# Patient Record
Sex: FEMALE | Race: WHITE | NOT HISPANIC OR LATINO | Employment: OTHER | URBAN - METROPOLITAN AREA
[De-identification: names, ages, dates, MRNs, and addresses within clinical notes are randomized per-mention and may not be internally consistent; named-entity substitution may affect disease eponyms.]

---

## 2018-06-01 ENCOUNTER — HOSPITAL ENCOUNTER (INPATIENT)
Facility: HOSPITAL | Age: 73
LOS: 3 days | Discharge: HOME HEALTH | DRG: 074 | End: 2018-06-04
Attending: EMERGENCY MEDICINE | Admitting: EMERGENCY MEDICINE
Payer: MEDICARE

## 2018-06-01 DIAGNOSIS — Z74.09 IMPAIRED FUNCTIONAL MOBILITY AND ENDURANCE: ICD-10-CM

## 2018-06-01 DIAGNOSIS — Z92.82 STATUS POST ADMINISTRATION OF TPA (RTPA) IN A DIFFERENT FACILITY WITHIN THE LAST 24 HOURS PRIOR TO ADMISSION TO CURRENT FACILITY: ICD-10-CM

## 2018-06-01 DIAGNOSIS — G51.0 BELL'S PALSY: ICD-10-CM

## 2018-06-01 DIAGNOSIS — I10 ESSENTIAL HYPERTENSION: ICD-10-CM

## 2018-06-01 DIAGNOSIS — E78.2 MIXED HYPERLIPIDEMIA: ICD-10-CM

## 2018-06-01 DIAGNOSIS — I63.9 STROKE: Primary | ICD-10-CM

## 2018-06-01 DIAGNOSIS — R53.1 LEFT-SIDED WEAKNESS: ICD-10-CM

## 2018-06-01 PROBLEM — I63.311 THROMBOTIC STROKE INVOLVING RIGHT MIDDLE CEREBRAL ARTERY: Status: ACTIVE | Noted: 2018-06-01

## 2018-06-01 PROBLEM — E78.5 HYPERLIPIDEMIA: Status: ACTIVE | Noted: 2018-06-01

## 2018-06-01 PROBLEM — E11.9 TYPE 2 DIABETES MELLITUS: Status: ACTIVE | Noted: 2018-06-01

## 2018-06-01 LAB
ABO + RH BLD: NORMAL
ALBUMIN SERPL BCP-MCNC: 4 G/DL
ALP SERPL-CCNC: 45 U/L
ALT SERPL W/O P-5'-P-CCNC: 26 U/L
ANION GAP SERPL CALC-SCNC: 9 MMOL/L
APTT BLDCRRT: 24.4 SEC
AST SERPL-CCNC: 27 U/L
BASOPHILS # BLD AUTO: 0.04 K/UL
BASOPHILS NFR BLD: 0.5 %
BILIRUB SERPL-MCNC: 0.5 MG/DL
BILIRUB UR QL STRIP: NEGATIVE
BLD GP AB SCN CELLS X3 SERPL QL: NORMAL
BUN SERPL-MCNC: 18 MG/DL
CALCIUM SERPL-MCNC: 9.8 MG/DL
CHLORIDE SERPL-SCNC: 105 MMOL/L
CHOLEST SERPL-MCNC: 103 MG/DL
CHOLEST/HDLC SERPL: 2.1 {RATIO}
CK MB SERPL-MCNC: 1.4 NG/ML
CK MB SERPL-RTO: 2.6 %
CK SERPL-CCNC: 54 U/L
CLARITY UR REFRACT.AUTO: CLEAR
CO2 SERPL-SCNC: 24 MMOL/L
COLOR UR AUTO: ABNORMAL
CREAT SERPL-MCNC: 0.7 MG/DL (ref 0.5–1.4)
CREAT SERPL-MCNC: 0.8 MG/DL
DIFFERENTIAL METHOD: NORMAL
EOSINOPHIL # BLD AUTO: 0.1 K/UL
EOSINOPHIL NFR BLD: 1.8 %
ERYTHROCYTE [DISTWIDTH] IN BLOOD BY AUTOMATED COUNT: 11.8 %
EST. GFR  (AFRICAN AMERICAN): >60 ML/MIN/1.73 M^2
EST. GFR  (NON AFRICAN AMERICAN): >60 ML/MIN/1.73 M^2
ESTIMATED AVG GLUCOSE: 180 MG/DL
ETHANOL SERPL-MCNC: <10 MG/DL
GLUCOSE SERPL-MCNC: 144 MG/DL
GLUCOSE UR QL STRIP: ABNORMAL
HBA1C MFR BLD HPLC: 7.9 %
HCT VFR BLD AUTO: 38.5 %
HDLC SERPL-MCNC: 48 MG/DL
HDLC SERPL: 46.6 %
HGB BLD-MCNC: 13 G/DL
HGB UR QL STRIP: NEGATIVE
IMM GRANULOCYTES # BLD AUTO: 0.04 K/UL
IMM GRANULOCYTES NFR BLD AUTO: 0.5 %
INR PPP: 1.1
KETONES UR QL STRIP: NEGATIVE
LDLC SERPL CALC-MCNC: 28.6 MG/DL
LEUKOCYTE ESTERASE UR QL STRIP: ABNORMAL
LYMPHOCYTES # BLD AUTO: 2 K/UL
LYMPHOCYTES NFR BLD: 24.8 %
MCH RBC QN AUTO: 30.4 PG
MCHC RBC AUTO-ENTMCNC: 33.8 G/DL
MCV RBC AUTO: 90 FL
MICROSCOPIC COMMENT: ABNORMAL
MONOCYTES # BLD AUTO: 0.8 K/UL
MONOCYTES NFR BLD: 10.4 %
NEUTROPHILS # BLD AUTO: 4.9 K/UL
NEUTROPHILS NFR BLD: 62 %
NITRITE UR QL STRIP: NEGATIVE
NON-SQ EPI CELLS #/AREA URNS AUTO: 1 /HPF
NONHDLC SERPL-MCNC: 55 MG/DL
NRBC BLD-RTO: 0 /100 WBC
PH UR STRIP: 6 [PH] (ref 5–8)
PLATELET # BLD AUTO: 276 K/UL
PMV BLD AUTO: 10.3 FL
POC PTINR: 1.1 (ref 0.9–1.2)
POC PTWBT: 12.6 SEC (ref 9.7–14.3)
POCT GLUCOSE: 127 MG/DL (ref 70–110)
POTASSIUM SERPL-SCNC: 4 MMOL/L
PROT SERPL-MCNC: 6.8 G/DL
PROT UR QL STRIP: NEGATIVE
PROTHROMBIN TIME: 11.7 SEC
RBC # BLD AUTO: 4.28 M/UL
SAMPLE: NORMAL
SAMPLE: NORMAL
SODIUM SERPL-SCNC: 138 MMOL/L
SP GR UR STRIP: 1.01 (ref 1–1.03)
SQUAMOUS #/AREA URNS AUTO: 0 /HPF
TRIGL SERPL-MCNC: 132 MG/DL
TROPONIN I SERPL DL<=0.01 NG/ML-MCNC: <0.006 NG/ML
TSH SERPL DL<=0.005 MIU/L-ACNC: 1.82 UIU/ML
URN SPEC COLLECT METH UR: ABNORMAL
UROBILINOGEN UR STRIP-ACNC: NEGATIVE EU/DL
WBC # BLD AUTO: 7.87 K/UL
WBC #/AREA URNS AUTO: 6 /HPF (ref 0–5)

## 2018-06-01 PROCEDURE — 99233 SBSQ HOSP IP/OBS HIGH 50: CPT | Mod: ,,, | Performed by: PSYCHIATRY & NEUROLOGY

## 2018-06-01 PROCEDURE — 81001 URINALYSIS AUTO W/SCOPE: CPT

## 2018-06-01 PROCEDURE — 93010 ELECTROCARDIOGRAM REPORT: CPT | Mod: 76,,, | Performed by: INTERNAL MEDICINE

## 2018-06-01 PROCEDURE — 25000003 PHARM REV CODE 250: Performed by: NURSE PRACTITIONER

## 2018-06-01 PROCEDURE — 99285 EMERGENCY DEPT VISIT HI MDM: CPT | Mod: 25

## 2018-06-01 PROCEDURE — 86901 BLOOD TYPING SEROLOGIC RH(D): CPT

## 2018-06-01 PROCEDURE — 82550 ASSAY OF CK (CPK): CPT

## 2018-06-01 PROCEDURE — 94761 N-INVAS EAR/PLS OXIMETRY MLT: CPT

## 2018-06-01 PROCEDURE — 80061 LIPID PANEL: CPT

## 2018-06-01 PROCEDURE — 96374 THER/PROPH/DIAG INJ IV PUSH: CPT

## 2018-06-01 PROCEDURE — 85610 PROTHROMBIN TIME: CPT

## 2018-06-01 PROCEDURE — 80320 DRUG SCREEN QUANTALCOHOLS: CPT

## 2018-06-01 PROCEDURE — 93005 ELECTROCARDIOGRAM TRACING: CPT

## 2018-06-01 PROCEDURE — 99900035 HC TECH TIME PER 15 MIN (STAT)

## 2018-06-01 PROCEDURE — 99285 EMERGENCY DEPT VISIT HI MDM: CPT | Mod: ,,, | Performed by: EMERGENCY MEDICINE

## 2018-06-01 PROCEDURE — 84484 ASSAY OF TROPONIN QUANT: CPT

## 2018-06-01 PROCEDURE — 84443 ASSAY THYROID STIM HORMONE: CPT

## 2018-06-01 PROCEDURE — 25000003 PHARM REV CODE 250: Performed by: EMERGENCY MEDICINE

## 2018-06-01 PROCEDURE — 93010 ELECTROCARDIOGRAM REPORT: CPT | Mod: ,,, | Performed by: INTERNAL MEDICINE

## 2018-06-01 PROCEDURE — 80053 COMPREHEN METABOLIC PANEL: CPT

## 2018-06-01 PROCEDURE — 85025 COMPLETE CBC W/AUTO DIFF WBC: CPT

## 2018-06-01 PROCEDURE — 85730 THROMBOPLASTIN TIME PARTIAL: CPT

## 2018-06-01 PROCEDURE — 82962 GLUCOSE BLOOD TEST: CPT

## 2018-06-01 PROCEDURE — 20000000 HC ICU ROOM

## 2018-06-01 PROCEDURE — 83036 HEMOGLOBIN GLYCOSYLATED A1C: CPT

## 2018-06-01 PROCEDURE — 82803 BLOOD GASES ANY COMBINATION: CPT

## 2018-06-01 PROCEDURE — 82553 CREATINE MB FRACTION: CPT

## 2018-06-01 PROCEDURE — 82565 ASSAY OF CREATININE: CPT

## 2018-06-01 PROCEDURE — 99223 1ST HOSP IP/OBS HIGH 75: CPT | Mod: ,,, | Performed by: NURSE PRACTITIONER

## 2018-06-01 PROCEDURE — 25500020 PHARM REV CODE 255: Performed by: EMERGENCY MEDICINE

## 2018-06-01 RX ORDER — LABETALOL HYDROCHLORIDE 5 MG/ML
10 INJECTION, SOLUTION INTRAVENOUS ONCE
Status: DISCONTINUED | OUTPATIENT
Start: 2018-06-01 | End: 2018-06-01

## 2018-06-01 RX ORDER — GLUCAGON 1 MG
1 KIT INJECTION
Status: DISCONTINUED | OUTPATIENT
Start: 2018-06-01 | End: 2018-06-04 | Stop reason: HOSPADM

## 2018-06-01 RX ORDER — INSULIN ASPART 100 [IU]/ML
1-10 INJECTION, SOLUTION INTRAVENOUS; SUBCUTANEOUS EVERY 6 HOURS PRN
Status: DISCONTINUED | OUTPATIENT
Start: 2018-06-01 | End: 2018-06-04 | Stop reason: HOSPADM

## 2018-06-01 RX ORDER — LABETALOL HYDROCHLORIDE 5 MG/ML
10 INJECTION, SOLUTION INTRAVENOUS EVERY 4 HOURS PRN
Status: DISCONTINUED | OUTPATIENT
Start: 2018-06-01 | End: 2018-06-04 | Stop reason: HOSPADM

## 2018-06-01 RX ORDER — POTASSIUM CHLORIDE 20 MEQ/15ML
40 SOLUTION ORAL
Status: DISCONTINUED | OUTPATIENT
Start: 2018-06-01 | End: 2018-06-03

## 2018-06-01 RX ORDER — SODIUM,POTASSIUM PHOSPHATES 280-250MG
2 POWDER IN PACKET (EA) ORAL
Status: DISCONTINUED | OUTPATIENT
Start: 2018-06-01 | End: 2018-06-03

## 2018-06-01 RX ORDER — LABETALOL HYDROCHLORIDE 5 MG/ML
10 INJECTION, SOLUTION INTRAVENOUS ONCE
Status: COMPLETED | OUTPATIENT
Start: 2018-06-01 | End: 2018-06-01

## 2018-06-01 RX ORDER — POLYETHYLENE GLYCOL 3350 17 G/17G
17 POWDER, FOR SOLUTION ORAL DAILY
Status: DISCONTINUED | OUTPATIENT
Start: 2018-06-02 | End: 2018-06-04 | Stop reason: HOSPADM

## 2018-06-01 RX ORDER — LANOLIN ALCOHOL/MO/W.PET/CERES
800 CREAM (GRAM) TOPICAL
Status: DISCONTINUED | OUTPATIENT
Start: 2018-06-01 | End: 2018-06-03

## 2018-06-01 RX ORDER — SODIUM CHLORIDE 9 MG/ML
INJECTION, SOLUTION INTRAVENOUS CONTINUOUS
Status: DISCONTINUED | OUTPATIENT
Start: 2018-06-01 | End: 2018-06-02

## 2018-06-01 RX ORDER — SODIUM CHLORIDE 0.9 % (FLUSH) 0.9 %
3 SYRINGE (ML) INJECTION EVERY 8 HOURS
Status: DISCONTINUED | OUTPATIENT
Start: 2018-06-01 | End: 2018-06-04 | Stop reason: HOSPADM

## 2018-06-01 RX ORDER — ATORVASTATIN CALCIUM 20 MG/1
40 TABLET, FILM COATED ORAL DAILY
Status: DISCONTINUED | OUTPATIENT
Start: 2018-06-02 | End: 2018-06-04 | Stop reason: HOSPADM

## 2018-06-01 RX ORDER — ACETAMINOPHEN 325 MG/1
650 TABLET ORAL EVERY 6 HOURS PRN
Status: DISCONTINUED | OUTPATIENT
Start: 2018-06-01 | End: 2018-06-04 | Stop reason: HOSPADM

## 2018-06-01 RX ORDER — ONDANSETRON 8 MG/1
8 TABLET, ORALLY DISINTEGRATING ORAL EVERY 8 HOURS PRN
Status: DISCONTINUED | OUTPATIENT
Start: 2018-06-01 | End: 2018-06-04 | Stop reason: HOSPADM

## 2018-06-01 RX ADMIN — IOHEXOL 100 ML: 350 INJECTION, SOLUTION INTRAVENOUS at 06:06

## 2018-06-01 RX ADMIN — LABETALOL HYDROCHLORIDE 10 MG: 5 INJECTION INTRAVENOUS at 07:06

## 2018-06-01 RX ADMIN — SODIUM CHLORIDE: 0.9 INJECTION, SOLUTION INTRAVENOUS at 09:06

## 2018-06-01 NOTE — ED PROVIDER NOTES
Encounter Date: 6/1/2018    SCRIBE #1 NOTE: I, Reena Grady, am scribing for, and in the presence of,  Dr. Khan. I have scribed the entire note.       History     Chief Complaint   Patient presents with    Cerebrovascular Accident     Pt is a s/p TPA transfer from [facility] in [place], [place].      Time patient was seen by the provider: 6:55 PM      The patient is a 73 yo W with a phx of DM and HTN who was transferred from [facility] in [place], [place] to the ED for neuro evaluation. The patient was initially presenting with CVA symptoms and received TPA. Patient was accepted by Neurology. She endorses acute onset difficulty speaking, difficulty sucking through a straw which has improved somewhat. Reports feeling improved s/p TPA. Denies HA. Patient denies any known hx of irregular heart rhythm. Denies CP.          Review of patient's allergies indicates:  Not on File  No past medical history on file.  No past surgical history on file.  No family history on file.  Social History   Substance Use Topics    Smoking status: Not on file    Smokeless tobacco: Not on file    Alcohol use Not on file     Review of Systems   Constitutional: Negative for fever.   HENT: Negative for sore throat.    Respiratory: Positive for chest tightness. Negative for shortness of breath.    Cardiovascular: Negative for chest pain.   Gastrointestinal: Negative for nausea.   Genitourinary: Negative for dysuria.   Musculoskeletal: Negative for back pain.   Skin: Negative for rash.   Neurological: Positive for speech difficulty. Negative for weakness.   Hematological: Does not bruise/bleed easily.       Physical Exam     Initial Vitals [06/01/18 1832]   BP Pulse Resp Temp SpO2   (!) 164/77 67 16 98.6 °F (37 °C) 98 %      MAP       106         Physical Exam    Nursing note and vitals reviewed.  Constitutional: She appears well-developed and well-nourished. She is not diaphoretic. No distress.   HENT:   Head:  Normocephalic and atraumatic.   Mouth/Throat: Oropharynx is clear and moist.   Eyes: Conjunctivae and EOM are normal.   Neck: Normal range of motion. Neck supple. No JVD present.   Cardiovascular: Normal rate, regular rhythm, normal heart sounds and intact distal pulses. Exam reveals no gallop and no friction rub.    No murmur heard.  Pulmonary/Chest: Breath sounds normal. No respiratory distress. She has no wheezes. She has no rhonchi. She has no rales. She exhibits no tenderness.   Abdominal: Soft. Bowel sounds are normal. She exhibits no distension and no mass. There is no tenderness. There is no rebound and no guarding.   Musculoskeletal: Normal range of motion. She exhibits no tenderness.   Lymphadenopathy:     She has no cervical adenopathy.   Neurological: She is alert and oriented to person, place, and time. She has normal strength. A cranial nerve deficit is present. No sensory deficit.   Flattening of left nasolabial fold. Dysarthria. Cranial nerves otherwise intact. Negative pronator drift. Finger to nose symmetry bilaterally.    Skin: Skin is warm and dry. Capillary refill takes less than 2 seconds.   Psychiatric: She has a normal mood and affect.         ED Course   Procedures  Labs Reviewed   COMPREHENSIVE METABOLIC PANEL - Abnormal; Notable for the following:        Result Value    Glucose 144 (*)     Alkaline Phosphatase 45 (*)     All other components within normal limits   LIPID PANEL - Abnormal; Notable for the following:     Cholesterol 103 (*)     LDL Cholesterol 28.6 (*)     All other components within normal limits   URINALYSIS - Abnormal; Notable for the following:     Glucose, UA 1+ (*)     Leukocytes, UA 1+ (*)     All other components within normal limits   URINALYSIS MICROSCOPIC - Abnormal; Notable for the following:     WBC, UA 6 (*)     Non-Squam Epith 1 (*)     All other components within normal limits   POCT GLUCOSE - Abnormal; Notable for the following:     POCT Glucose 127 (*)      All other components within normal limits   CBC W/ AUTO DIFFERENTIAL   PROTIME-INR   TSH   TROPONIN I   CK-MB   APTT   ALCOHOL,MEDICAL (ETHANOL)   HEMOGLOBIN A1C   HEMOGLOBIN A1C   POCT GLUCOSE   TYPE & SCREEN   ISTAT CREATININE   ISTAT PROCEDURE   POCT GLUCOSE MONITORING CONTINUOUS     EKG Readings: (Independently Interpreted)   Sinus rhythm first degree AV block at a rate of 61. Left axis deviation. ST segments normal. T waves normal.           Medical Decision Making:   History:   Old Medical Records: I decided to obtain old medical records.  Initial Assessment:   72 Y.O. female with a hx of DM and HTN presents as a transfer for CVA s/p TPA. She was seen and evaluated by the vascular neurology team. CTA multiphase revealed no large vesicle occlusion amenable to procedure. Will admit to stroke unit.             Scribe Attestation:   Scribe #1: I performed the above scribed service and the documentation accurately describes the services I performed. I attest to the accuracy of the note.               Clinical Impression:   The encounter diagnosis was Stroke.    X-Ray Chest AP Portable   Final Result      See above      No acute process seen.         Electronically signed by: Abner Will MD   Date:    06/01/2018   Time:    19:41      CTA STROKE MULTI-PHASE   Final Result      Normal multiphase CT angiogram of the head and neck.  No evidence of large vessel occlusion in the intracranial circulation or hemodynamically significant stenosis in the neck vessels.      No acute intracranial abnormality.  Please consider MRI of the brain for follow-up.      Electronically signed by resident: Ryan Johnson   Date:    06/01/2018   Time:    18:56      Electronically signed by: David Thornton MD   Date:    06/01/2018   Time:    19:26                                 Cosmo Khan MD  06/01/18 2059

## 2018-06-02 PROBLEM — E78.2 MIXED HYPERLIPIDEMIA: Status: ACTIVE | Noted: 2018-06-01

## 2018-06-02 LAB
ALBUMIN SERPL BCP-MCNC: 3.8 G/DL
ALP SERPL-CCNC: 45 U/L
ALT SERPL W/O P-5'-P-CCNC: 25 U/L
ANION GAP SERPL CALC-SCNC: 9 MMOL/L
AST SERPL-CCNC: 22 U/L
BASOPHILS # BLD AUTO: 0.03 K/UL
BASOPHILS NFR BLD: 0.4 %
BILIRUB SERPL-MCNC: 0.7 MG/DL
BUN SERPL-MCNC: 17 MG/DL
CALCIUM SERPL-MCNC: 9.7 MG/DL
CHLORIDE SERPL-SCNC: 107 MMOL/L
CO2 SERPL-SCNC: 25 MMOL/L
CREAT SERPL-MCNC: 0.8 MG/DL
DIASTOLIC DYSFUNCTION: NO
DIFFERENTIAL METHOD: NORMAL
EOSINOPHIL # BLD AUTO: 0.2 K/UL
EOSINOPHIL NFR BLD: 2.6 %
ERYTHROCYTE [DISTWIDTH] IN BLOOD BY AUTOMATED COUNT: 11.7 %
EST. GFR  (AFRICAN AMERICAN): >60 ML/MIN/1.73 M^2
EST. GFR  (NON AFRICAN AMERICAN): >60 ML/MIN/1.73 M^2
ESTIMATED PA SYSTOLIC PRESSURE: 24.34
GLUCOSE SERPL-MCNC: 116 MG/DL
HCT VFR BLD AUTO: 37.4 %
HGB BLD-MCNC: 12.5 G/DL
IMM GRANULOCYTES # BLD AUTO: 0.03 K/UL
IMM GRANULOCYTES NFR BLD AUTO: 0.4 %
INR PPP: 1.1
LYMPHOCYTES # BLD AUTO: 2.4 K/UL
LYMPHOCYTES NFR BLD: 31.7 %
MAGNESIUM SERPL-MCNC: 1.9 MG/DL
MCH RBC QN AUTO: 30.6 PG
MCHC RBC AUTO-ENTMCNC: 33.4 G/DL
MCV RBC AUTO: 91 FL
MONOCYTES # BLD AUTO: 0.8 K/UL
MONOCYTES NFR BLD: 11.1 %
NEUTROPHILS # BLD AUTO: 4.1 K/UL
NEUTROPHILS NFR BLD: 53.8 %
NRBC BLD-RTO: 0 /100 WBC
PHOSPHATE SERPL-MCNC: 3.1 MG/DL
PLATELET # BLD AUTO: 260 K/UL
PMV BLD AUTO: 10.2 FL
POCT GLUCOSE: 105 MG/DL (ref 70–110)
POCT GLUCOSE: 154 MG/DL (ref 70–110)
POCT GLUCOSE: 218 MG/DL (ref 70–110)
POCT GLUCOSE: 220 MG/DL (ref 70–110)
POTASSIUM SERPL-SCNC: 3.8 MMOL/L
PROT SERPL-MCNC: 6.3 G/DL
PROTHROMBIN TIME: 11.6 SEC
RBC # BLD AUTO: 4.09 M/UL
RETIRED EF AND QEF - SEE NOTES: 65 (ref 55–65)
SODIUM SERPL-SCNC: 141 MMOL/L
TRICUSPID VALVE REGURGITATION: NORMAL
WBC # BLD AUTO: 7.55 K/UL

## 2018-06-02 PROCEDURE — G8997 SWALLOW GOAL STATUS: HCPCS | Mod: CI

## 2018-06-02 PROCEDURE — 84100 ASSAY OF PHOSPHORUS: CPT

## 2018-06-02 PROCEDURE — 80053 COMPREHEN METABOLIC PANEL: CPT

## 2018-06-02 PROCEDURE — 97161 PT EVAL LOW COMPLEX 20 MIN: CPT

## 2018-06-02 PROCEDURE — 97166 OT EVAL MOD COMPLEX 45 MIN: CPT

## 2018-06-02 PROCEDURE — 63600175 PHARM REV CODE 636 W HCPCS: Performed by: NURSE PRACTITIONER

## 2018-06-02 PROCEDURE — G8988 SELF CARE GOAL STATUS: HCPCS | Mod: CJ

## 2018-06-02 PROCEDURE — 93306 TTE W/DOPPLER COMPLETE: CPT

## 2018-06-02 PROCEDURE — 25000003 PHARM REV CODE 250: Performed by: PHYSICIAN ASSISTANT

## 2018-06-02 PROCEDURE — 97116 GAIT TRAINING THERAPY: CPT

## 2018-06-02 PROCEDURE — 85610 PROTHROMBIN TIME: CPT

## 2018-06-02 PROCEDURE — 25000003 PHARM REV CODE 250: Performed by: NURSE PRACTITIONER

## 2018-06-02 PROCEDURE — 83735 ASSAY OF MAGNESIUM: CPT

## 2018-06-02 PROCEDURE — G8987 SELF CARE CURRENT STATUS: HCPCS | Mod: CK

## 2018-06-02 PROCEDURE — 92610 EVALUATE SWALLOWING FUNCTION: CPT

## 2018-06-02 PROCEDURE — 99233 SBSQ HOSP IP/OBS HIGH 50: CPT | Mod: ,,, | Performed by: PSYCHIATRY & NEUROLOGY

## 2018-06-02 PROCEDURE — A4216 STERILE WATER/SALINE, 10 ML: HCPCS | Performed by: NURSE PRACTITIONER

## 2018-06-02 PROCEDURE — G8979 MOBILITY GOAL STATUS: HCPCS | Mod: CJ

## 2018-06-02 PROCEDURE — G8978 MOBILITY CURRENT STATUS: HCPCS | Mod: CK

## 2018-06-02 PROCEDURE — 20000000 HC ICU ROOM

## 2018-06-02 PROCEDURE — 85025 COMPLETE CBC W/AUTO DIFF WBC: CPT

## 2018-06-02 PROCEDURE — 25000003 PHARM REV CODE 250: Performed by: STUDENT IN AN ORGANIZED HEALTH CARE EDUCATION/TRAINING PROGRAM

## 2018-06-02 PROCEDURE — G8996 SWALLOW CURRENT STATUS: HCPCS | Mod: CJ

## 2018-06-02 PROCEDURE — 93306 TTE W/DOPPLER COMPLETE: CPT | Mod: 26,,, | Performed by: INTERNAL MEDICINE

## 2018-06-02 PROCEDURE — 97530 THERAPEUTIC ACTIVITIES: CPT

## 2018-06-02 RX ORDER — LOSARTAN POTASSIUM 50 MG/1
75 TABLET ORAL DAILY
COMMUNITY

## 2018-06-02 RX ORDER — LOSARTAN POTASSIUM 50 MG/1
50 TABLET ORAL DAILY
Status: DISCONTINUED | OUTPATIENT
Start: 2018-06-02 | End: 2018-06-04 | Stop reason: HOSPADM

## 2018-06-02 RX ORDER — METFORMIN HYDROCHLORIDE 500 MG/1
500 TABLET ORAL 2 TIMES DAILY WITH MEALS
COMMUNITY
End: 2022-12-09 | Stop reason: CLARIF

## 2018-06-02 RX ORDER — INSULIN GLARGINE 100 [IU]/ML
65 INJECTION, SOLUTION SUBCUTANEOUS NIGHTLY
COMMUNITY

## 2018-06-02 RX ORDER — GABAPENTIN 300 MG/1
300 CAPSULE ORAL 2 TIMES DAILY
Status: DISCONTINUED | OUTPATIENT
Start: 2018-06-02 | End: 2018-06-03

## 2018-06-02 RX ORDER — GABAPENTIN 300 MG/1
300 CAPSULE ORAL ONCE
Status: COMPLETED | OUTPATIENT
Start: 2018-06-02 | End: 2018-06-02

## 2018-06-02 RX ORDER — GABAPENTIN 300 MG/1
300 CAPSULE ORAL
COMMUNITY

## 2018-06-02 RX ADMIN — ACETAMINOPHEN 650 MG: 325 TABLET ORAL at 08:06

## 2018-06-02 RX ADMIN — ACETAMINOPHEN 650 MG: 325 TABLET ORAL at 12:06

## 2018-06-02 RX ADMIN — ATORVASTATIN CALCIUM 40 MG: 20 TABLET, FILM COATED ORAL at 09:06

## 2018-06-02 RX ADMIN — Medication 3 ML: at 01:06

## 2018-06-02 RX ADMIN — LABETALOL HYDROCHLORIDE 10 MG: 5 INJECTION, SOLUTION INTRAVENOUS at 03:06

## 2018-06-02 RX ADMIN — ACETAMINOPHEN 650 MG: 325 TABLET ORAL at 07:06

## 2018-06-02 RX ADMIN — LABETALOL HYDROCHLORIDE 10 MG: 5 INJECTION, SOLUTION INTRAVENOUS at 06:06

## 2018-06-02 RX ADMIN — GABAPENTIN 300 MG: 300 CAPSULE ORAL at 08:06

## 2018-06-02 RX ADMIN — ACETAMINOPHEN 650 MG: 325 TABLET ORAL at 01:06

## 2018-06-02 RX ADMIN — POLYETHYLENE GLYCOL 3350 17 G: 17 POWDER, FOR SOLUTION ORAL at 09:06

## 2018-06-02 RX ADMIN — Medication 3 ML: at 10:06

## 2018-06-02 RX ADMIN — LOSARTAN POTASSIUM 50 MG: 50 TABLET, FILM COATED ORAL at 01:06

## 2018-06-02 RX ADMIN — GABAPENTIN 300 MG: 300 CAPSULE ORAL at 05:06

## 2018-06-02 RX ADMIN — INSULIN ASPART 4 UNITS: 100 INJECTION, SOLUTION INTRAVENOUS; SUBCUTANEOUS at 05:06

## 2018-06-02 NOTE — PROGRESS NOTES
[facility]  Neurocritical Care  Progress Note    Admit Date: 6/1/2018  Service Date: 06/02/2018  Length of Stay: 1    Subjective:     Chief Complaint: Status post administration of tPA (rtPA) in a different facility within the last 24 hours prior to admission to current facility    History of Present Illness: The patient is a 72 year old F with PMHx of DM, CAD,  HTN, TIA and Von Willebrand disease transferred to [facility] s/p tPA for R MCA syndrome. Last known normal  around 14:30 pm. She stated her speech became very slurred, L sided weakness and numbness and she could not drink water. Initial NIHSS 5. CTMP -no LVO.  She states her speech is still slurred and she is also stuttering, however complains of L facial and L sided numbness to light touch. Patient denies vision disturbance, headache, SOB, or N/V/D. Patient admitted to [facility] for close monitoring and higher level of care.     Hospital Course: 6:1: Pt admitted to [facility], pending MRI 6/2 6/2: MRI negative for ischemic stroke. Clinical history suggestive of bell's palsy      SUBJECTIVE:     Interval History/Significant Events: no acute events overnight    Review of Symptoms:   Constitutional: Denies fevers or chills.  Pulmonary: Denies shortness of breath or cough.  Cardiology: Denies chest pain or palpitations.  GI: Denies abdominal pain or constipation.  Neurologic: Denies new weakness, headaches, or paresthesias.     Medications:  Continuous Infusions:   sodium chloride 0.9% Stopped (06/02/18 1429)     Scheduled Meds:   atorvastatin  40 mg Oral Daily    gabapentin  300 mg Oral BID    losartan  50 mg Oral Daily    polyethylene glycol  17 g Oral Daily    sodium chloride 0.9%  3 mL Intravenous Q8H     PRN Meds:.acetaminophen, dextrose 50%, glucagon (human recombinant), insulin aspart U-100, labetalol, magnesium oxide, magnesium oxide, ondansetron, potassium chloride 10%, potassium chloride 10%,  potassium chloride 10%, potassium, sodium phosphates, potassium, sodium phosphates, potassium, sodium phosphates    OBJECTIVE:   Vital Signs (Most Recent):   Temp: 98.4 °F (36.9 °C) (06/02/18 1500)  Pulse: 63 (06/02/18 1600)  Resp: 18 (06/02/18 1600)  BP: (!) 163/77 (06/02/18 1600)  SpO2: 98 % (06/02/18 1600)    Vital Signs (24h Range):   Temp:  [97.9 °F (36.6 °C)-98.6 °F (37 °C)] 98.4 °F (36.9 °C)  Pulse:  [50-76] 63  Resp:  [13-23] 18  SpO2:  [95 %-99 %] 98 %  BP: (123-187)/(57-90) 163/77    ICP/CPP (Last 24h):        I & O (Last 24h):   Intake/Output Summary (Last 24 hours) at 06/02/18 1619  Last data filed at 06/02/18 1400   Gross per 24 hour   Intake             2385 ml   Output                0 ml   Net             2385 ml     Physical Exam:  GA: Alert, comfortable, no acute distress.   HEENT: No scleral icterus or JVD.   Pulmonary: Clear to auscultation A/P/L. No wheezing, crackles, or rhonchi.  Cardiac: RRR S1 & S2 w/o rubs/murmurs/gallops.   Abdominal: Bowel sounds present x 4. No appreciable hepatosplenomegaly.  Skin: No jaundice, rashes, or visible lesions.  Neck: tenderness of the posterior auricular region  Neuro:  --GCS: 15  --Mental Status:  Awake and alert, stuttering speech, follows commands. Fund of knowledge intact  --Pupils 3.5 mm, PERRL.   Left facial nerve palsy LMN type  --LUE strength: 5/5  --RUE strength: 5/5  --LLE strength: 5/5  --RLE strength: 5/5  Subjective decrease in sensation on left LE    Vent Data:        Lines/Drains/Airway:          Nutrition/Tube Feeds (if NPO state why): po     Labs:  ABG: No results for input(s): PH, PO2, PCO2, HCO3, POCSATURATED, BE in the last 24 hours.  BMP:  Recent Labs  Lab 06/02/18  0337      K 3.8      CO2 25   BUN 17   CREATININE 0.8   *   MG 1.9   PHOS 3.1     LFT: Lab Results   Component Value Date    AST 22 06/02/2018    ALT 25 06/02/2018    ALKPHOS 45 (L) 06/02/2018    BILITOT 0.7 06/02/2018    ALBUMIN 3.8 06/02/2018    PROT 6.3  06/02/2018     CBC:   Lab Results   Component Value Date    WBC 7.55 06/02/2018    HGB 12.5 06/02/2018    HCT 37.4 06/02/2018    MCV 91 06/02/2018     06/02/2018     Microbiology x 7d:   Microbiology Results (last 7 days)     ** No results found for the last 168 hours. **        Imaging:  MRI brain - neg for acute ischemic lesion  I personally reviewed the above image.    ASSESSMENT/PLAN:     Active Hospital Problems    Diagnosis    *Status post administration of tPA (rtPA) in a different facility within the last 24 hours prior to admission to current facility    Thrombotic stroke involving right middle cerebral artery    HTN (hypertension)    Type 2 diabetes mellitus    Hyperlipidemia    Stroke      Neuro:   Post tpa for acute onset of focal neurological symptoms  MRI neg  Clinical picture suspicious for Bell's palsy  Trial of medrol dose pack and valtrex    Pulmonary:   Stable saturations on room air    Cardiac:   Hemodynamically stable  Resume home antihypertensives    Renal:    Making urine    ID:   Afebrile, normal wbc    Hem/Onc:   Stable hh and plt counts    Endocrine:    BS stable    Fluids/Electrolytes/Nutrition/GI:   po  Lines:  Art NA  CVC NA  ETT NA  Fabian NA  NG NA  PEG NA    Proph:  DVT:scd/heparin 24hrs post thrombolysis  Constipation:  Last output:bowel regimen  PUP:NA  VAP:NA    Full Code  Uninterrupted Critical Care/Counseling Time (not including procedures):: III    Emory Sepulveda MD  Neurocritical care attending    Emory Sepulveda MD  Neurocritical Care  [facility]

## 2018-06-02 NOTE — HOSPITAL COURSE
6:1: Pt admitted to [facility], pending MRI 6/2 6/2: MRI negative for ischemic stroke. Clinical history suggestive of bell's palsy  6/3: no acute events overnight

## 2018-06-02 NOTE — PT/OT/SLP EVAL
Speech Language Pathology Evaluation  Bedside Swallow    Patient Name:  Sandy HALL Grammar   MRN:  [id number]   [id number] A    Admitting Diagnosis: Status post administration of tPA (rtPA) in a different facility within the last 24 hours prior to admission to current facility    Recommendations:                 General Recommendations:  Dysphagia therapy, Speech/language therapy and Cognitive-linguistic therapy  Diet recommendations:  Dental Soft, Thin   Aspiration Precautions: Alternating bites/sips, Check for pocketing/oral residue, HOB to 90 degrees, Meds whole 1 at a time, Monitor for s/s of aspiration, Remain upright 30 minutes post meal, Small bites/sips and Standard aspiration precautions   General Precautions: Standard, aspiration, fall  Communication strategies:  none    History:     Past Medical History:   Diagnosis Date    DM (diabetes mellitus)     HTN (hypertension)     TIA (transient ischemic attack)     Von Willebrand disease      MD Note: The patient is a 71 yo W with a phx of DM and HTN who was transferred from [facility] in [place], [place] to the ED for neuro evaluation. The patient was initially presenting with CVA symptoms and received TPA. Patient was accepted by Neurology. She endorses acute onset difficulty speaking, difficulty sucking through a straw which has improved somewhat. Reports feeling improved s/p TPA. Denies HA. Patient denies any known hx of irregular heart rhythm. Denies CP.    History reviewed. No pertinent surgical history.    Chest X-Rays: No acute process seen.    Prior diet: reg/thin      Subjective     Patient awake and cooperative with daughter present in room.  Patient presents with mild-moderate dysfluencies in conversation c/b repetitions of initial sounds. Patient and daughter report patient does not have fluency difficulties at baseline.     Patient goals: to go home    Pain/Comfort:  · Pain Rating 1: 0/10   · Patient reporting pain on left side of face,  head, and neck. ST notified RN and MD of reported left side pain    Objective:     Oral Musculature Evaluation  · Oral Musculature: left weakness, facial asymmetry present  · Mucosal Quality: dry  · Oral Labial Strength and Mobility: impaired retraction, impaired seal  · Buccal Strength and Mobility: decreased tone  · Volitional Cough: elicited  · Volitional Swallow: timely  · Voice Prior to PO Intake: clear    Bedside Swallow Eval:   Consistencies Assessed:  · Thin liquids sips of water via cup and straw x 6 ounces  · Puree tsp bites of pudding x2  · Solids bites of danielle cracker x2     Oral Phase:   · Dry mouth  · Excess chewing  · Lingual residue   · Patient reporting some difficulties with chewing 2/2 left side oral motor weakness  · Patient reporting previous difficulty with drinking from a straw. No noted difficulties when placing straw in right side of oral cavity.     Pharyngeal Phase:   · no overt clinical signs/symptoms of aspiration  · no overt clinical signs/symptoms of pharyngeal dysphagia    Compensatory Strategies  · None    Treatment:  SLP provided patient and family (daughter) education on SLP role, s/s and risks of aspiraiton, safe swallow precautions, POC. Patient and patient's daughter verbalized understanding of all discussed. RN and MD notified of recommendations.     Assessment:     Sandy Prince is a 72 y.o. female with an SLP diagnosis of Dysphagia.  She presents with xerostomia and slow oral transit time. Dysfluencies evident in conversation. ST will continue to follow to follow up with diet and complete a Rcclrb-Fxhqzxfu-Wnylwrfnn Evaluation.     Goals:    SLP Goals        Problem: SLP Goal    Goal Priority Disciplines Outcome   SLP Goal     SLP Ongoing (interventions implemented as appropriate)   Description:  Speech Therapy Short Term Goals  Goal expected to be met by 6/9  1. Pt will tolerate a dental soft diet and thin liquids with no overt s/s of aspiration.   2. Pt will participate  in a speech, language, and cognitive evaluation with possible updated goals to follow pending results.                        Plan:     · Patient to be seen:  4 x/week   · Plan of Care expires:  07/01/18  · Plan of Care reviewed with:  patient, daughter   · SLP Follow-Up:  Yes       Discharge recommendations:   (pending slc eval)   Barriers to Discharge:  None    Time Tracking:     SLP Treatment Date:   06/02/18  Speech Start Time:  0910  Speech Stop Time:  0921     Speech Total Time (min):  11 min    Billable Minutes: Eval Swallow and Oral Function 11    ANNIKA Brown, CCC-SLP   Pager: [phone]  06/02/2018

## 2018-06-02 NOTE — PLAN OF CARE
Problem: SLP Goal  Goal: SLP Goal  Speech Therapy Short Term Goals  Goal expected to be met by 6/9  1. Pt will tolerate a dental soft diet and thin liquids with no overt s/s of aspiration.   2. Pt will participate in a speech, language, and cognitive evaluation with possible updated goals to follow pending results.      Outcome: Ongoing (interventions implemented as appropriate)  BSS completed. Recommend: dental soft diet, thin liquids, standard aspiration precautions. ST will continue to follow to complete a Paqvul-Azuhyhxe-Khyblfzwh Evaluation with updated goals to follow pending results.   LILLIANA Julian., CCC-SLP  Pager: [phone]  06/02/2018

## 2018-06-02 NOTE — H&P
[facility]  Neurocritical Care  History & Physical    Admit Date: 6/1/2018  Service Date: 06/01/2018  Length of Stay: 0    Subjective:     Chief Complaint: Status post administration of tPA (rtPA) in a different facility within the last 24 hours prior to admission to current facility    History of Present Illness: The patient is a 72 year old F with PMHx of DM, CAD,  HTN, TIA and Von Willebrand disease [place] [facility] [place] [facility] s/p tPA for R MCA syndrome. Last known normal  around 14:30 pm. She stated her speech became very slurred, L sided weakness and numbness and she could not drink water. Initial NIHSS 5. CTMP -no LVO.  She states her speech is still slurred and she is also stuttering, however complains of L facial and L sided numbness to light touch. Patient denies vision disturbance, headache, SOB, or N/V/D. Patient admitted to [facility] for close monitoring and higher level of care.         No current facility-administered medications on file prior to encounter.      No current outpatient prescriptions on file prior to encounter.      Allergies: Aspirin    Patient did not report family hx  Social History   Substance Use Topics    Smoking status: Not on file    Smokeless tobacco: Not on file    Alcohol use Not on file     Review of Systems   Genitourinary: Negative for difficulty urinating and dysuria.        Review of symptoms  Constitutional: Denies fevers or chills.  Pulmonary: Denies shortness of breath or cough.  Cardiology: Denies chest pain or palpitations. Complains of intermittent chest tightness  GI: Denies abdominal pain or constipation.  Neurologic: Complains of L sided  Weakness and numbness,denies  headache  Objective:     Vitals:    Temp: 98.6 °F (37 °C)  Pulse: 62  BP: (!) 157/63  MAP (mmHg): 93  Resp: 20  SpO2: 96 %  O2 Device (Oxygen Therapy): room air    Temp  Min: 98 °F (36.7 °C)  Max: 98.6 °F (37 °C)  Pulse  Min: 62  Max: 76  BP   Min: 132/57  Max: 184/82  MAP (mmHg)  Min: 65  Max: 127  Resp  Min: 16  Max: 20  SpO2  Min: 95 %  Max: 99 %    No intake/output data recorded.           Physical Exam   Skin: Skin is warm and dry.       Physical Exam:  GA: Alert, comfortable, no acute distress.   HEENT: No scleral icterus or JVD.   Pulmonary: Clear to auscultation A/L. No wheezing, crackles, or rhonchi.  Cardiac: RRR S1 & S2 w/o rubs/murmurs/gallops.   Abdominal: Bowel sounds present x 4. No appreciable hepatosplenomegaly.  Skin: No jaundice, rashes, or visible lesions.  Neuro:  --GCS: E4 V5 M6  --Mental Status: AAOX4, follows commands, moves all extremities,dysarthric speach  --CN II-XII grossly intact.   --Pupils 3mm, PERRL.   --Corneal reflex, gag, cough intact.  --LUE strength: 4/5  --RUE strength: 5/5  --LLE strength: 4+/5  --RLE strength: 5/5    Today I personally reviewed pertinent medications, lines/drains/airways, imaging, lab results,     Assessment/Plan:     Status post administration of tPA (rtPA) in a different facility within the last 24 hours prior to admission to current facility  S/p tPA   --Continue Neuro checks q 1hr  -- Vascular Neurology following  -- CT MP-no LVO  -- CT Head  At outside facility-no acute intracranial hemorrhage, no mass effect.    -- SBP goal <180  -- PT/OT/Speech  -- MRI brain pending (6/2)     Hyperlipidemia    -- pending lipid panel  -- atorvastatin 40 mg daily        HTN (hypertension)    -- SBP goal <180  -- PRN Labetalol  -- Echo pending                     Endocrine   Type 2 diabetes mellitus    -- Hx of DM  -- A1C pending  -- PRN SSI            Prophylaxis:  Venous Thromboembolism: mechanical  Stress Ulcer: NA  Ventilator Pneumonia: not applicable     Full Code    Sanaz Brito NP  Neurocritical Care  [facility]

## 2018-06-02 NOTE — PT/OT/SLP EVAL
Physical Therapy Evaluation    Patient Name:  Sandy Prince   MRN:  [id number]    Recommendations:     Discharge Recommendations:  rehabilitation facility   Discharge Equipment Recommendations:  (TBD)   Barriers to discharge: None    Assessment:     Sandy Prince is a 72 y.o. female admitted with a medical diagnosis of Status post administration of tPA (rtPA) in a different facility within the last 24 hours prior to admission to current facility.  She presents with the following impairments/functional limitations:  weakness, impaired endurance, impaired self care skills, impaired functional mobilty, gait instability, impaired balance, decreased lower extremity function, decreased upper extremity function, impaired cognition Pt. cooperative and tolerated treatment well except for c/o HA and (L) LE weakness/buckling. Pt. would benefit from continued PT to increase strength/endurance and improve functional mobility.    Rehab Prognosis:  good; patient would benefit from acute skilled PT services to address these deficits and reach maximum level of function.      Recent Surgery: * No surgery found *      Plan:     During this hospitalization, patient to be seen 5 x/week to address the above listed problems via gait training, therapeutic activities, therapeutic exercises, neuromuscular re-education  · Plan of Care Expires:  07/02/18   Plan of Care Reviewed with: patient, daughter    Subjective     Communicated with nursing prior to session.  Patient found seated in bedside chair upon PT entry to room, agreeable to evaluation.      Chief Complaint: HA, (L) hand weakness, (L) LE weakness, speech impairment  Patient comments/goals: to get stronger  Pain/Comfort:  · Pain Rating 1:  (headache 10/10)    Patients cultural, spiritual, Anabaptism conflicts given the current situation: no    Living Environment:  Pt. Lives with daughter and 2 grandchildren in 2-story home and no PABLO. Pt.'s bedroom/bathroom is on first floor.  Prior to  admission, patients level of function was indep.  Patient has the following equipment: none (access to cane and w/c).  Upon discharge, patient will have assistance from family    Objective:     Patient found with: blood pressure cuff, peripheral IV, pulse ox (continuous), telemetry     General Precautions: Standard, aspiration, fall   Orthopedic Precautions:N/A   Braces:       Exams:  · RUE ROM: WFL  · RUE Strength: WFL  · LUE ROM: WFL  · LUE Strength: WFL except decreased  strength  · RLE ROM: WFL  · RLE Strength: WFL  · LLE ROM: WFL  · LLE Strength: 4/5    Functional Mobility:  · Transfers:     · Sit to Stand:  contact guard assistance and minimum assistance with no AD and rolling walker  · Gait: 8 steps(4 forward/backward) with HHA and CGA-Min A. After seated rest break, amb. 100' with RW and CGA-Min A. Pt. with slight (L) LE shaking/buckling, but no significant LOB.  · Balance: fair-    AM-PAC 6 CLICK MOBILITY  Total Score:18       Therapeutic Activities and Exercises:   Discussed LE therex while seated, goals, and POC    Patient left up in chair with all lines intact and call button in reach.    GOALS:    Physical Therapy Goals        Problem: Physical Therapy Goal    Goal Priority Disciplines Outcome Goal Variances Interventions   Physical Therapy Goal     PT/OT, PT Ongoing (interventions implemented as appropriate)     Description:  Goals to be met by: 2018     Patient will increase functional independence with mobility by performin. Supine to sit with Set-up [place]  2. Sit to supine with Set-up [place]  3. Sit to stand transfer with Supervision  4. Bed to chair transfer with Supervision with/without AD  5. Gait  x 200 feet with Supervision with/without AD.   6. Lower extremity exercise program x15 reps per handout, with supervision                      History:     Past Medical History:   Diagnosis Date    DM (diabetes mellitus)     HTN (hypertension)     TIA (transient  ischemic attack)     Von Willebrand disease        History reviewed. No pertinent surgical history.    Clinical Decision Making:     History  Co-morbidities and personal factors that may impact the plan of care Examination  Body Structures and Functions, activity limitations and participation restrictions that may impact the plan of care Clinical Presentation   Decision Making/ Complexity Score   Co-morbidities:   [] Time since onset of injury / illness / exacerbation  [] Status of current condition  []Patient's cognitive status and safety concerns    [] Multiple Medical Problems (see med hx)  Personal Factors:   [] Patient's age  [] Prior Level of function   [] Patient's home situation (environment and family support)  [] Patient's level of motivation  [] Expected progression of patient      HISTORY:(criteria)    [] 90924 - no personal factors/history    [] 29479 - has 1-2 personal factor/comorbidity     [] 55644 - has >3 personal factor/comorbidity     Body Regions:  [] Objective examination findings  [] Head     []  Neck  [] Trunk   [] Upper Extremity  [] Lower Extremity    Body Systems:  [] For communication ability, affect, cognition, language, and learning style: the assessment of the ability to make needs known, consciousness, orientation (person, place, and time), expected emotional /behavioral responses, and learning preferences (eg, learning barriers, education  needs)  [] For the neuromuscular system: a general assessment of gross coordinated movement (eg, balance, gait, locomotion, transfers, and transitions) and motor function  (motor control and motor learning)  [] For the musculoskeletal system: the assessment of gross symmetry, gross range of motion, gross strength, height, and weight  [] For the integumentary system: the assessment of pliability(texture), presence of scar formation, skin color, and skin integrity  [] For cardiovascular/pulmonary system: the assessment of heart rate, respiratory  rate, blood pressure, and edema     Activity limitations:    [] Patient's cognitive status and saf ety concerns          [] Status of current condition      [] Weight bearing restriction  [] Cardiopulmunary Restriction    Participation Restrictions:   [] Goals and goal agreement with the patient     [] Rehab potential (prognosis) and probable outcome      Examination of Body System: (criteria)    [] 67826 - addressing 1-2 elements    [] 68321 - addressing a total of 3 or more elements     [] 49566 -  Addressing a total of 4 or more elements         Clinical Presentation: (criteria)  Choose one     On examination of body system using standardized tests and measures patient presents with (CHOOSE ONE) elements from any of the following: body structures and functions, activity limitations, and/or participation restrictions.  Leading to a clinical presentation that is considered (CHOOSE ONE)                              Clinical Decision Making  (Eval Complexity):  Choose One     Time Tracking:     PT Received On: 06/02/18  PT Start Time: 1504     PT Stop Time: 1532  PT Total Time (min): 28 min     Billable Minutes: Evaluation 20 and Gait Training 8      Nadeem De La Paz, PT  06/02/2018

## 2018-06-02 NOTE — PLAN OF CARE
Problem: Patient Care Overview  Goal: Plan of Care Review  Outcome: Ongoing (interventions implemented as appropriate)  POC reviewed with pt and family at 1400. Pt verbalized understanding. Questions and concerns addressed. No acute events today. Pt progressing toward goals. Will continue to monitor. See flowsheets for full assessment and VS info.     MRI brain complete.  Plan to transfer out of ICU today.

## 2018-06-02 NOTE — HPI
72 year old female with a past medical history of HTN, DM, CAD, neuropathy, and previous TIA presents today as a trasnfer from [facility] for CTA MP for possible intervention. This afternoon at 1430 she started experiencing weakness/numbness on her left sided consisting of facial and extremity weakness. Dr. Rapp completed a telestroke on the patient and TPA was given at 1647. Patient was transferred for CTA MP which did not reveal a LVO patient is therefore not an IR candidate.

## 2018-06-02 NOTE — ASSESSMENT & PLAN NOTE
Contributing Nutrition Diagnosis  Inadequate energy intake    Related to (etiology):   NPO    Signs and Symptoms (as evidenced by):   Pt meeting 0% EEN/EPN     Interventions/Recommendations (treatment strategy):  See recs.    Nutrition Diagnosis Status:   New

## 2018-06-02 NOTE — CONSULTS
"  [facility]  Adult Nutrition  Consult Note    SUMMARY     Recommendations    Recommendation/Intervention:   1. Recommend advancing diet to 2000 diabetic with dental soft texture per SLP recommendations.   2. If PO intake consistently <50% of meals, order Boost Glucose Control.  3. RD following.    Goals: Advancement of diet by RD follow up  Nutrition Goal Status: new  Communication of RD Recs:  (POC)    Reason for Assessment    Reason for Assessment: consult  Diagnosis: stroke/CVA  Relevant Medical History: HTN, T2DM, TIA, CAD    General Information Comments: Pt transferred from outside facility s/p R MCA syndrome. With some speech difficulty. Cleared for dental soft diet per SLP but diet not yet advanced.    Nutrition Discharge Planning: Adequate PO intake on carb controlled diet    Nutrition Risk Screen    Nutrition Risk Screen: no indicators present    Nutrition/Diet History    Do you have any cultural, spiritual, Bahai conflicts, given your current situation?: N/A  Factors Affecting Nutritional Intake: NPO, difficulty/impaired swallowing, dry mouth    Anthropometrics    Temp: 98.5 °F (36.9 °C)  Height Method: Stated  Height: 5' 7" (170.2 cm)  Height (inches): 67 in  Weight Method: Estimated  Weight: 74.8 kg (165 lb)  Weight (lb): 165 lb  Ideal Body Weight (IBW), Female: 135 lb  % Ideal Body Weight, Female (lb): 122.22 lb  BMI (Calculated): 25.9  BMI Grade: 25 - 29.9 - overweight       Lab/Procedures/Meds    Pertinent Labs Reviewed: reviewed  Pertinent Labs Comments: Glu 116, HbA1c 7.9  Pertinent Medications Reviewed: reviewed  Pertinent Medications Comments: statin, polyethylene glycol, IVF    Physical Findings/Assessment    Overall Physical Appearance: nourished  Oral/Mouth Cavity: WDL  Skin: intact    Estimated/Assessed Needs    Weight Used For Calorie Calculations: 74.8 kg (164 lb 14.5 oz)  Energy Calorie Requirements (kcal): 1600  Energy Need Method: [place]-St Jeor (x 1.25 " (PAL))  Protein Requirements: 75-90 gm (1.2-1.4 gm/kg)  Weight Used For Protein Calculations: 74.8 kg (164 lb 14.5 oz)     Fluid Need Method: RDA Method (1 ml/kcal or per MD)  RDA Method (mL): 1600       Nutrition Prescription Ordered    Current Diet Order: NPO  Nutrition Order Comments: Cleared for dental soft per SLP eval    Evaluation of Received Nutrient/Fluid Intake    IV Fluid (mL):  (NS @ 75 ml/hr)  % Intake of Estimated Energy Needs: 0 - 25 %  % Meal Intake: NPO    Nutrition Risk    Level of Risk/Frequency of Follow-up:  (F/u 2x weekly)     Assessment and Plan    Thrombotic stroke involving right middle cerebral artery    Contributing Nutrition Diagnosis  Inadequate energy intake    Related to (etiology):   NPO    Signs and Symptoms (as evidenced by):   Pt meeting 0% EEN/EPN     Interventions/Recommendations (treatment strategy):  See recs.    Nutrition Diagnosis Status:   New           Monitor and Evaluation    Food and Nutrient Intake: energy intake, food and beverage intake  Food and Nutrient Adminstration: diet order  Physical Activity and Function: nutrition-related ADLs and IADLs  Anthropometric Measurements: weight, weight change, body mass index  Biochemical Data, Medical Tests and Procedures: electrolyte and renal panel, gastrointestinal profile, glucose/endocrine profile, inflammatory profile  Nutrition-Focused Physical Findings: overall appearance     Nutrition Follow-Up    RD Follow-up?: Yes

## 2018-06-02 NOTE — PLAN OF CARE
Problem: Physical Therapy Goal  Goal: Physical Therapy Goal  Goals to be met by: 2018     Patient will increase functional independence with mobility by performin. Supine to sit with Set-up [place]  2. Sit to supine with Set-up [place]  3. Sit to stand transfer with Supervision  4. Bed to chair transfer with Supervision with/without AD  5. Gait  x 200 feet with Supervision with/without AD.   6. Lower extremity exercise program x15 reps per handout, with supervision    Outcome: Ongoing (interventions implemented as appropriate)  Goals set

## 2018-06-02 NOTE — HPI
The patient is a 72 year old F with PMHx of DM, CAD,  HTN, TIA and Von Willebrand disease transferred to [facility] from [facility] s/p tPA for R MCA syndrome. Last known normal  around 14:30 pm. She stated her speech became very slurred, L sided weakness and numbness and she could not drink water. Initial NIHSS 5. CTMP -no LVO.  She states her speech is still slurred and she is also stuttering, however complains of L facial and L sided numbness to light touch. Patient denies vision disturbance, headache, SOB, or N/V/D. Patient admitted to [facility] for close monitoring and higher level of care.

## 2018-06-02 NOTE — CONSULTS
[facility]  Vascular Neurology  Comprehensive Stroke Center  Consult Note    Inpatient consult to Vascular (Stroke) Neurology  Consult performed by: MESHA DUFF  Consult ordered by: CRYSTAL MELVIN  Reason for consult: Stroke        Assessment/Plan:     Patient is a 72 y.o. year old female with:    * Thrombotic stroke involving right middle cerebral artery    Trasnfer from [facility] for CTA MP for possible intervention. Dr. Rapp completed a telestroke on the patient and TPA was given at 1647. Neurological symptoms consisted of LSW and numbness with patient complaining weakness in her face and tongue. Patient was transferred for CTA MP which did not reveal a LVO therefore patient not an IR candidate. On exam patient has LLE drift and c/o of blurry vision in her left eye with a HA in that area. No hemianopsia appreciated on exam. Patient is having right facial twitching mainly with her right eye. She states her speech is slurred and she is also stuttering. Not aphasic. Admit to [facility] s/p TPA infusion and for close neurological monitoring. Etiology likely small vessel.     Antithrombotics for secondary stroke prevention:  None for 24 hrs s/p TPA (pt is ax to ASA)    Statins for secondary stroke prevention and hyperlipidemia, if present:   Statins: Atorvastatin- 40 mg daily if LDL > 70     Aggressive risk factor modification: HTN, DM, HLD, CAD     Rehab efforts: PT/OT/SLP to evaluate and treat    Diagnostics ordered/pending: HgbA1C to assess blood glucose levels, Lipid Profile to assess cholesterol levels, MRI head without contrast to assess brain parenchyma, TTE to assess cardiac function/status , TSH to assess thyroid function    VTE prophylaxis: None: Reason for No Pharmacological VTE Prophylaxis: hold for 24 hrs s/p TPA transfusion    BP parameters: Infarct: Post tPA, SBP <180            Status post administration of tPA (rtPA) in a different facility within the last 24 hours prior to  admission to current facility    TPA started @ 1647  Admit to [facility] s/p TPA administration         Hyperlipidemia    Stroke risk factor  LDL pending  If >70 Atorvastatin 40 mg         Type 2 diabetes mellitus    Stroke risk factor   Hgb A1C pending  SSI         HTN (hypertension)    Stroke risk factor  SBP <180  Management per primary team             STROKE DOCUMENTATION     Acute Stroke Times   Last Known Normal Date: 06/01/18  Last Known Normal Time: 1430  Symptom Onset Date: 06/01/18  Symptom Onset Time: 1430  Stroke Team Called Date: 06/01/18  Stroke Team Called Time: 1836  Stroke Team Arrival Date: 06/01/18  Stroke Team Arrival Time: 1840  CT Interpretation Time: 1857  Decision to Treat Time for Alteplase: 1647  Decision to Treat Time for IR:  (No LVO)    NIH Scale:  1a. Level Of Consciousness: 0-->Alert: keenly responsive  1b. LOC Questions: 0-->Answers both questions correctly  1c. LOC Commands: 0-->Performs both tasks correctly  2. Best Gaze: 0-->Normal  3. Visual: 0-->No visual loss  4. Facial Palsy: 2-->Partial paralysis (total or near-total paralysis of lower face)  5a. Motor Arm, Left: 0-->No drift: limb holds 90 (or 45) degrees for full 10 secs  5b. Motor Arm, Right: 0-->No drift: limb holds 90 (or 45) degrees for full 10 secs  6a. Motor Leg, Left: 1-->Drift: leg falls by the end of the 5-sec period but does not hit bed  6b. Motor Leg, Right: 0-->No drift: leg holds 30 degree position for full 5 secs  7. Limb Ataxia: 0-->Absent  8. Sensory: 1-->Mild-to-moderate sensory loss: patient feels pinprick is less sharp or is dull on the affected side: or there is a loss of superficial pain with pinprick, but patient is aware of being touched  9. Best Language: 0-->No aphasia: normal  10. Dysarthria: 1-->Mild-to-moderate dysarthria: patient slurs at least some words and, at worst, can be understood with some difficulty  11. Extinction and Inattention (formerly Neglect): 0-->No abnormality  Total (NIH Stroke  Scale): 5    Modified [place] Score: 0  [place] Coma Scale:    ABCD2 Score:    [id number] Score:   HAS -BLED Score:   ICH Score:   Hunt & Galvin Classification:       Thrombolysis Candidate? Yes, given prior to arrival at outside hospital      Interventional Revascularization Candidate?   Is the patient eligible for mechanical endovascular reperfusion (VIVIANE)?  No; No large vessel occlusion      Hemorrhagic change of an Ischemic Stroke: Does this patient have an ischemic stroke with hemorrhagic changes? No     Subjective:     History of Present Illness:  72 year old female with a past medical history of HTN, DM, CAD, neuropathy, and previous TIA presents today as a trasnfer from [facility] for CTA MP for possible intervention. This afternoon at 1430 she started experiencing weakness/numbness on her left sided consisting of facial and extremity weakness. Dr. Rapp completed a telestroke on the patient and TPA was given at 1647. Patient was transferred for CTA MP which did not reveal a LVO therefore patient is not an IR candidate.        No past medical history on file.  No past surgical history on file.  No family history on file.  Social History   Substance Use Topics    Smoking status: Not on file    Smokeless tobacco: Not on file    Alcohol use Not on file     Review of patient's allergies indicates:  Not on File    Medications: I have reviewed the current medication administration record.      (Not in a hospital admission)    Review of Systems   Constitutional: Negative for fever.   HENT: Negative for trouble swallowing.    Eyes:        Left eye blurry vision   Respiratory: Negative for shortness of breath.    Cardiovascular: Negative for chest pain.   Gastrointestinal: Negative for nausea and vomiting.   Genitourinary: Negative for hematuria and urgency.   Neurological: Positive for facial asymmetry, speech difficulty, weakness and headaches.   Psychiatric/Behavioral: Negative for confusion.     Objective:      Vital Signs (Most Recent):  Temp: 98.6 °F (37 °C) (06/01/18 1832)  Pulse: 70 (06/01/18 1917)  Resp: 18 (06/01/18 1917)  BP: (!) 180/82 (06/01/18 1917)  SpO2: 99 % (06/01/18 1917)    Vital Signs Range (Last 24H):  Temp:  [98 °F (36.7 °C)-98.6 °F (37 °C)]   Pulse:  [65-76]   Resp:  [16-20]   BP: (132-184)/(57-83)   SpO2:  [95 %-99 %]     Physical Exam   Constitutional: She is oriented to person, place, and time. She appears well-developed.   HENT:   Head: Normocephalic and atraumatic.   Eyes: EOM are normal. Pupils are equal, round, and reactive to light.   Cardiovascular: Normal rate.    Pulmonary/Chest: Effort normal.   Abdominal: Soft.   Musculoskeletal: Normal range of motion.   Neurological: She is alert and oriented to person, place, and time.   Skin: Skin is warm.   Psychiatric: She has a normal mood and affect.   Vitals reviewed.      Neurological Exam:   LOC: alert  Attention Span: Good   Language: No aphasia  Articulation: Dysarthria  Orientation: Person, Place, Time   Visual Fields: Full  EOM (CN III, IV, VI): Full/intact  Pupils (CN II, III): PERRL  Facial Movement (CN VII): Lower facial weakness on the left  Motor: Arm left  Normal 5/5  Leg left  Normal 4/5  Arm right  Normal 5/5  Leg right Normal 5/5  Cebellar: No evidence of appendicular or axial ataxia  Sensation: Intact to light touch, temperature and vibration      Laboratory:  CMP: No results for input(s): GLUCOSE, CALCIUM, ALBUMIN, PROT, NA, K, CO2, CL, BUN, CREATININE, ALKPHOS, ALT, AST, BILITOT in the last 24 hours.  CBC:   Recent Labs  Lab 06/01/18 1912   WBC 7.87   RBC 4.28   HGB 13.0   HCT 38.5      MCV 90   MCH 30.4   MCHC 33.8     Lipid Panel: No results for input(s): CHOL, LDLCALC, HDL, TRIG in the last 168 hours.  Coagulation:   Recent Labs  Lab 06/01/18 1912   INR 1.1     Hgb A1C: No results for input(s): HGBA1C in the last 168 hours.  TSH: No results for input(s): TSH in the last 168 hours.    Diagnostic Results:      Brain  imaging:  CTA MP 6/1  Normal multiphase CT angiogram of the head and neck.  No evidence of large vessel occlusion in the intracranial circulation or hemodynamically significant stenosis in the neck vessels.    No acute intracranial abnormality.  Please consider MRI of the brain for follow-up.        Cardiac Evaluation:   Echo pending      Audrey Parikh NP  [facility]  Department of Vascular Neurology   [facility]

## 2018-06-02 NOTE — ED NOTES
LOC: The patient is awake, alert and aware of environment with an appropriate affect, the patient is oriented x 3 and speaking appropriately.  APPEARANCE: Patient resting comfortably and in no acute distress, patient is clean and well groomed, patient's clothing is properly fastened.  SKIN: The skin is warm and dry, patient has normal skin turgor and moist mucus membranes, skin intact, no breakdown or brusing noted.  MUSKULOSKELETAL: Patient moving all extremities well, no obvious swelling or deformities noted.  RESPIRATORY: Airway is open and patent, respirations are spontaneous, patient has a normal effort and rate. Breath sounds are clear and equal bilaterally.  CARDIAC: Normal heart sounds. No peripheral edema.  ABDOMEN: Soft and non tender to palpation, no distention noted. Bowel sounds present.  NEURO: (see neuro assessment)

## 2018-06-02 NOTE — ASSESSMENT & PLAN NOTE
S/p tPA   --Continue Neuro checks q 1hr  -- Vascular Neurology following  -- CT MP-no LVO  -- CT Head  At outside facility-no acute intracranial hemorrhage, no mass effect.    -- SBP goal <180  -- PT/OT/Speech  -- MRI brain pending (6/2)

## 2018-06-02 NOTE — PT/OT/SLP EVAL
"Occupational Therapy   Evaluation    Name: Sandy Prince  MRN: [id number]  Admitting Diagnosis:  Status post administration of tPA (rtPA) in a different facility within the last 24 hours prior to admission to current facility      Recommendations:     Discharge Recommendations:  Rehab  Discharge Equipment Recommendations:   (TBD)  Barriers to discharge:   None    History:     Occupational Profile:  Living Environment: Pt lives in [place], [place] with daughter, son-in-law, and two grand kids (26 and 31 y/o); Bathroom contains tub-shower combo with no DME.   Previous level of function: PTA, pt reports being I with ADL and functional mobility.  Roles and Routines: She works as a caregiver for a 98y/o ( she assist with cooking/cleaning/laundry/errends/cuts hair) She does not provide physical assistance; She drives; teaches bible study; she has a mission trip planned in October to go to [facility]  Equipment Owned:  none  Assistance upon Discharge: Family is able to provide assistance    Past Medical History:   Diagnosis Date    DM (diabetes mellitus)     HTN (hypertension)     TIA (transient ischemic attack)     Von Willebrand disease        History reviewed. No pertinent surgical history.    Subjective     Chief Complaint: " My left leg feels like its going to give out"   Patient/Family stated goals: Return to PLOF  Communicated with: RN prior to session.  Pain/Comfort:  · Pain Rating 1: 0/10  · Pain Rating Post-Intervention 1: 0/10    Objective:     Patient found with: pulse ox (continuous), telemetry, peripheral IV, SCD, blood pressure cuff    General Precautions: Standard, fall, aspiration   Orthopedic Precautions:N/A   Braces: N/A     Occupational Performance:    Bed Mobility:    · Patient completed Rolling/Turning to Right with stand by assistance  · Patient completed Scooting/Bridging with stand by assistance  · Patient completed Supine to Sit with CGA  · Patient completed Sit to Supine with stand by " assistance    Functional Mobility/Transfers:  · Patient completed Sit <> Stand Transfer with contact guard assistance  with  HHA   · Functional Mobility: Pt took multiple steps in front of bed requiring min- mod A with HHA; pt L knee required blocking 2/2  Buckling     Activities of Daily Living:  · Grooming: stand by assistance to wash face while seated EOB  · UB Dressing: minimum assistance to brien gown like jacket while seated EOB; required assistance for snap and tie completion  · Toileting: minimum assistance to complete toilet on bed pan while seated EOB    Cognitive/Visual Perceptual:  Cognitive/Psychosocial Skills:     -       Oriented to: Person, Place, Time and Situation   -       Follows Commands/attention:Follows one-step commands  -       Communication: dysarthria and stuttering; word finding diffiuclty   -       Memory: No Deficits noted  -       Safety awareness/insight to disability: intact   -       Mood/Affect/Coping skills/emotional control: Appropriate to situation  Visual/Perceptual:      - Denied blurry vision; coordination deficit with L eye during visual scanning    Physical Exam:  Postural examination/scapula alignment:    -       No postural abnormalities identified  Skin integrity: Visible skin intact  Edema:  None noted  Sensation:    -       Intact  Motor Planning:    -       Intact  Dominant hand:    -       RUE  Upper Extremity Range of Motion:     -       Right Upper Extremity: WFL  -       Left Upper Extremity: WFL  Upper Extremity Strength:    -       Right Upper Extremity: WFL 5/5  -       Left Upper Extremity: WFL 4/5   Strength:    -       Right Upper Extremity: WFL 5/5  -       Left Upper Extremity: WFL  4/5  Fine Motor Coordination:    -       Pt able to complete but exhibited increased difficulty-- Left hand, finger to nose and Left hand thumb/finger opposition skills     Patient left supine with all lines intact, call button in reach and RN notified    [facility] 6  "Click:  [facility] Total Score: 17    Treatment & Education:  -Pt edu on OT role/POC  -Importance of OOB activity with staff assistance once out of TPA window -- UIC during each meal   -Safety during functional t/f and mobility   - White board updated  -Multiple self care tasks completed-- noted above  - All questions concerns/answered within OT scope of practice  - Edu pt on BUE/BLE HEP to complete 3x daily to improve overall strength -- pt demo'd understanding   Education:    Assessment:     Sandy Prince is a 72 y.o. female with a medical diagnosis of Status post administration of tPA (rtPA) in a different facility within the last 24 hours prior to admission to current facility.  She presents with mild L sided weakness/coordination deficits, facial droop, word finding difficulty that is impacting ability to perform self care tasks and functional mobility. Performance deficits affecting function are weakness, impaired endurance, impaired functional mobilty, gait instability, decreased upper extremity function, decreased lower extremity function, impaired self care skills, impaired balance, impaired cognition. Pt would benefit from rehab following d/c to continue to progress towards goals and improve quality of life.     Rehab Prognosis:  Good; patient would benefit from acute skilled OT services to address these deficits and reach maximum level of function.         Clinical Decision Makin.  OT Mod:  "Pt evaluation falls under moderate complexity for evaluation coding due to identification of 3-5 performance deficits noted as stated above. Eval required Min/Mod assistance to complete on this date and detailed assessment(s) were utilized. Moreover, an expanded review of history and occupational profile obtained with additional review of cognitive, physical and psychosocial hx."     Plan:     Patient to be seen 4 x/week to address the above listed problems via self-care/home management, therapeutic activities, " therapeutic exercises, neuromuscular re-education  · Plan of Care Expires: 06/30/18  · Plan of Care Reviewed with: patient, daughter    This Plan of care has been discussed with the patient who was involved in its development and understands and is in agreement with the identified goals and treatment plan    GOALS:    Occupational Therapy Goals        Problem: Occupational Therapy Goal    Goal Priority Disciplines Outcome Interventions   Occupational Therapy Goal     OT, PT/OT Ongoing (interventions implemented as appropriate)    Description:  Goals to be met by: 6/16    Patient will increase functional independence with ADLs by performing:    Feeding with [place].  UE Dressing with Stand-by Assistance (including snap and tie completion).  LE Dressing with Stand-by Assistance.  Grooming while standing at sink with Stand-by Assistance.  Toileting from toilet with Contact Guard Assistance for hygiene and clothing management.   Supine to sit with Supervision.  Toilet transfer to toilet with Stand-by Assistance.                      Time Tracking:     OT Date of Treatment: 06/02/18  OT Start Time: 0930  OT Stop Time: 0950  OT Total Time (min): 20 min    Billable Minutes:Evaluation 12  Therapeutic Activity 8    Anny kearney OT  6/2/2018

## 2018-06-02 NOTE — PROGRESS NOTES
Patient arrived to [facility]    Type of stroke/diagnosis: ischemic    TPA start time and end time (if applicable) 1647 and 1747    Skin assessment done: Yes  Wounds noted: none    NCC notified: Андрей Cline NP

## 2018-06-02 NOTE — PLAN OF CARE
Problem: Occupational Therapy Goal  Goal: Occupational Therapy Goal  Goals to be met by: 6/16    Patient will increase functional independence with ADLs by performing:    Feeding with [place].  UE Dressing with Stand-by Assistance (including snap and tie completion).  LE Dressing with Stand-by Assistance.  Grooming while standing at sink with Stand-by Assistance.  Toileting from toilet with Contact Guard Assistance for hygiene and clothing management.   Supine to sit with Supervision.  Toilet transfer to toilet with Stand-by Assistance.    Outcome: Ongoing (interventions implemented as appropriate)  Evaluation completed. Initiate POC.   Anny kearney OT  6/2/2018

## 2018-06-02 NOTE — PROGRESS NOTES
[facility]  Vascular Neurology  Comprehensive Stroke Center  Progress Note    Assessment/Plan:     * Status post administration of tPA (rtPA) in a different facility within the last 24 hours prior to admission to current facility    TPA started @ 1647 on 6/1  Admit to [facility] s/p TPA administration         Thrombotic stroke involving right middle cerebral artery    Trasnfer from [facility] for CTA MP for possible intervention. Dr. Rapp completed a telestroke on the patient and TPA was given at 1647. Neurological symptoms consisted of LSW and numbness with patient complaining weakness in her face and tongue. Patient was transferred for CTA MP which did not reveal a LVO therefore is not an IR candidate. On exam patient has LLE drift and c/o of blurry vision in her left eye with a HA in that area. No hemianopsia appreciated on exam. Patient is having right facial twitching mainly with her right eye. She states her speech is slurred and she is also stuttering. Not aphasic. Admit to [facility] s/p TPA infusion (started 6/1/18 1647) and for close neurological monitoring. Etiology likely small vessel.     NAEON. Patient continues to have left sided headache/pressure, LSW, and speech difficulty.. She reports that left eye blurry/double vision significantly improved.  TPA 24 hour window ends today @1800. She is not requiring anti-HTN meds for SBP goals.  Patient is pending MRI brain w/wo contrast today 6/2/18.      Antithrombotics for secondary stroke prevention:  None for 24 hrs s/p TPA (pt is ax to ASA)    Statins for secondary stroke prevention and hyperlipidemia, if present:   Statins: Atorvastatin- 40 mg daily if LDL > 70     Aggressive risk factor modification: HTN, DM, HLD, CAD     Rehab efforts: PT/OT/SLP to evaluate and treat    Diagnostics ordered/pending: HgbA1C to assess blood glucose levels, Lipid Profile to assess cholesterol levels, MRI head without contrast to assess brain parenchyma, TTE to  assess cardiac function/status , TSH to assess thyroid function    VTE prophylaxis: None: Reason for No Pharmacological VTE Prophylaxis: hold for 24 hrs s/p TPA transfusion    BP parameters: Infarct: Post tPA, SBP <180            Hyperlipidemia    Stroke risk factor  LDL 28.6  If >70 Atorvastatin 40 mg         Type 2 diabetes mellitus    Stroke risk factor   Hgb A1C 7.9  Home meds include lantus and metformin  POCT and SSI while admitted        HTN (hypertension)    Stroke risk factor  SBP <180  Management per primary team              No notes on file    STROKE DOCUMENTATION   Acute Stroke Times   Last Known Normal Date: 06/01/18  Last Known Normal Time: 1430  Symptom Onset Date: 06/01/18  Symptom Onset Time: 1430  Stroke Team Called Date: 06/01/18  Stroke Team Called Time: 1836  Stroke Team Arrival Date: 06/01/18  Stroke Team Arrival Time: 1840  CT Interpretation Time: 1857  Decision to Treat Time for Alteplase: 1647  Decision to Treat Time for IR:  (No LVO)    NIH Scale:  1a. Level Of Consciousness: 0-->Alert: keenly responsive  1b. LOC Questions: 0-->Answers both questions correctly  1c. LOC Commands: 0-->Performs both tasks correctly  2. Best Gaze: 0-->Normal  3. Visual: 1-->Partial hemianopia  4. Facial Palsy: 2-->Partial paralysis (total or near-total paralysis of lower face)  5a. Motor Arm, Left: 0-->No drift: limb holds 90 (or 45) degrees for full 10 secs  5b. Motor Arm, Right: 0-->No drift: limb holds 90 (or 45) degrees for full 10 secs  6a. Motor Leg, Left: 1-->Drift: leg falls by the end of the 5-sec period but does not hit bed  6b. Motor Leg, Right: 0-->No drift: leg holds 30 degree position for full 5 secs  7. Limb Ataxia: 0-->Absent  8. Sensory: 1-->Mild-to-moderate sensory loss: patient feels pinprick is less sharp or is dull on the affected side: or there is a loss of superficial pain with pinprick, but patient is aware of being touched  9. Best Language: 1-->Mild-to-moderate aphasia: some obvious  loss of fluency or facility of comprehension, without significant limitation on ideas expressed or form of expression. Reduction of speech and/or comprehension, however, makes conversation. . . (see row details)  10. Dysarthria: 1-->Mild-to-moderate dysarthria: patient slurs at least some words and, at worst, can be understood with some difficulty  11. Extinction and Inattention (formerly Neglect): 0-->No abnormality  Total (NIH Stroke Scale): 7       Modified [place] Score: 0  [place] Coma Scale:15   ABCD2 Score:    [id number] Score:   HAS -BLED Score:   ICH Score:   Hunt & Galvin Classification:      Hemorrhagic change of an Ischemic Stroke: Does this patient have an ischemic stroke with hemorrhagic changes? No     Neurologic Chief Complaint: left sided weakness, dysarthria, aphasia     Subjective:     Interval History: Patient is seen for follow-up neurological assessment and treatment recommendations:     CHAR. Patient continues to have left sided headache/pressure. She reports that left eye blurry/double vision significantly improved. Speech improved compared to yesterday but still with slurring and stuttering today. She reports of no change with left sided weakness/numbness since yesterday. Her left leg gave out with therapy today. Family at bedside. Patient is pending MRI brain w/wo contrast today. TPA 24 hour window ends today @1800.         HPI, Past Medical, Family, and Social History remains the same as documented in the initial encounter.     Review of Systems   Positive for speech difficulty, left vision issues, and left sided weakness.  Negative for CP, SOB, or lethargy.     Scheduled Meds:   atorvastatin  40 mg Oral Daily    polyethylene glycol  17 g Oral Daily    sodium chloride 0.9%  3 mL Intravenous Q8H     Continuous Infusions:   sodium chloride 0.9% 75 mL/hr at 06/02/18 1100     PRN Meds:acetaminophen, dextrose 50%, glucagon (human recombinant), insulin aspart U-100, labetalol, magnesium  oxide, magnesium oxide, ondansetron, potassium chloride 10%, potassium chloride 10%, potassium chloride 10%, potassium, sodium phosphates, potassium, sodium phosphates, potassium, sodium phosphates    Objective:     Vital Signs (Most Recent):  Temp: 98.5 °F (36.9 °C) (06/02/18 1100)  Pulse: (!) 57 (06/02/18 1100)  Resp: 20 (06/02/18 1100)  BP: (!) 182/90 (06/02/18 1100)  SpO2: 98 % (06/02/18 1100)       Vital Signs Range (Last 24H):  Temp:  [97.9 °F (36.6 °C)-98.6 °F (37 °C)]   Pulse:  [50-76]   Resp:  [13-23]   BP: (123-186)/(57-90)   SpO2:  [95 %-99 %]        Physical Exam  Vital signs: reviewed above.   Constitutional: well-developed, no acute distress  Cardiovascular: regular rate and rhythm  Resp: normal respirations, not labored, no accessory muscles used  Abdomen: soft, non-distended, not tender to palpation  Pulses: palpable distal pulses   Skin: warm, dry and intact, no rashes  Neurological  GCS: Motor: 6/Verbal: 5/Eyes: 4 GCS Total: 15  Mental Status: Awake, Alert, Oriented x 4  Follows commands   Dysarthria and expressive aphasia present  Good attention span  Head: normocephalic, atraumatic       Visual Fields: left upper peripheral field deficit       EOM (CN III, IV, VI): Full/intact       Pupils (CN II, III): PERRL        Facial Sensation (CN V): decreased sensation on left       Facial Movement (CN VII): left facial weakness  Moves all extremities with good strength except for LLE 4+/5  LLE drift present  Finger to nose and heel/shin normally bilaterally (slightly slower heel/shin on LLE)   Decrease sensation to light touch on left side   Normal tone throughout    Shoulder shrug symmetric  Tongue midline, palate raises symmetrically   Left brow raise weak      Laboratory:  CMP:   Recent Labs  Lab 06/02/18 0337   CALCIUM 9.7   ALBUMIN 3.8   PROT 6.3      K 3.8   CO2 25      BUN 17   CREATININE 0.8   ALKPHOS 45*   ALT 25   AST 22   BILITOT 0.7     CBC:   Recent Labs  Lab 06/02/18 0337    WBC 7.55   RBC 4.09   HGB 12.5   HCT 37.4      MCV 91   MCH 30.6   MCHC 33.4     Lipid Panel:   Recent Labs  Lab 06/01/18 1912   CHOL 103*   LDLCALC 28.6*   HDL 48   TRIG 132     Coagulation:   Recent Labs  Lab 06/01/18 1912 06/02/18  0337   INR 1.1 1.1   APTT 24.4  --      Platelet Aggregation Study: No results for input(s): PLTAGG, PLTAGINTERP, PLTAGREGLACO, ADPPLTAGGREG in the last 168 hours.  Hgb A1C:   Recent Labs  Lab 06/01/18 1912   HGBA1C 7.9*     TSH:   Recent Labs  Lab 06/01/18 1912   TSH 1.819       Diagnostic Results     Brain/Vessel imaging:  CTA MP 6/1  Normal multiphase CT angiogram of the head and neck.  No evidence of large vessel occlusion in the intracranial circulation or hemodynamically significant stenosis in the neck vessels.    No acute intracranial abnormality.  Please consider MRI of the brain for follow-up.         --Pending MRI brain w/o contrast 6/2/18          Cardiac Evaluation:   Echo pending      Gabriel Del Angel PA-C  Comprehensive Stroke Center  Department of Vascular Neurology   [facility]

## 2018-06-02 NOTE — ASSESSMENT & PLAN NOTE
Stroke risk factor   Hgb A1C 7.9  Home meds include lantus and metformin  POCT and SSI while admitted

## 2018-06-02 NOTE — ASSESSMENT & PLAN NOTE
Carlos from [facility] for CTA MP for possible intervention. Dr. Rapp completed a telestroke on the patient and TPA was given at 1647. Neurological symptoms consisted of LSW and numbness with patient complaining weakness in her face and tongue. Patient was transferred for CTA MP which did not reveal a LVO therefore is not an IR candidate. On exam patient has LLE drift and c/o of blurry vision in her left eye with a HA in that area. No hemianopsia appreciated on exam. Patient is having right facial twitching mainly with her right eye. She states her speech is slurred and she is also stuttering. Not aphasic. Admit to [facility] s/p TPA infusion (started 6/1/18 1647) and for close neurological monitoring. Etiology likely small vessel.     NAEON. Patient continues to have left sided headache/pressure, LSW, and speech difficulty.. She reports that left eye blurry/double vision significantly improved.  TPA 24 hour window ends today @1800. She is not requiring anti-HTN meds for SBP goals.  Patient is pending MRI brain w/wo contrast today 6/2/18.      Antithrombotics for secondary stroke prevention:  None for 24 hrs s/p TPA (pt is ax to ASA)    Statins for secondary stroke prevention and hyperlipidemia, if present:   Statins: Atorvastatin- 40 mg daily if LDL > 70     Aggressive risk factor modification: HTN, DM, HLD, CAD     Rehab efforts: PT/OT/SLP to evaluate and treat    Diagnostics ordered/pending: HgbA1C to assess blood glucose levels, Lipid Profile to assess cholesterol levels, MRI head without contrast to assess brain parenchyma, TTE to assess cardiac function/status , TSH to assess thyroid function    VTE prophylaxis: None: Reason for No Pharmacological VTE Prophylaxis: hold for 24 hrs s/p TPA transfusion    BP parameters: Infarct: Post tPA, SBP <180

## 2018-06-02 NOTE — PLAN OF CARE
Symptom onset with left facial weakness and left posterior auricular pain  Stylomastoid tenderness to percussion   Solumedrol dose pack and valtrex for possible Bell's palsy    Complicating the clinical picture is the associated presence of new onset stuttering, left sided numbness, left leg weakness.   MRI of the brain normal with no obvious focal lesion to explain above symptoms and signs.   Possible transfer to [facility].

## 2018-06-02 NOTE — ASSESSMENT & PLAN NOTE
Carlos from [facility] for CTA MP for possible intervention. Dr. Rapp completed a telestroke on the patient and TPA was given at 1647. Neurological symptoms consisted of LSW and numbness with patient complaining weakness in her face and tongue. Patient was transferred for CTA MP which did not reveal a LVO therefore is not an IR candidate. On exam patient has LLE drift and c/o of blurry vision in her left eye with a HA in that area. No hemianopsia appreciated on exam. Patient is having right facial twitching mainly with her right eye. She states her speech is slurred and she is also stuttering. Not aphasic. Admit to [facility] s/p TPA infusion and for close neurological monitoring. Etiology likely small vessel.     Antithrombotics for secondary stroke prevention:  None for 24 hrs s/p TPA (pt is ax to ASA)    Statins for secondary stroke prevention and hyperlipidemia, if present:   Statins: Atorvastatin- 40 mg daily if LDL > 70     Aggressive risk factor modification: HTN, DM, HLD, CAD     Rehab efforts: PT/OT/SLP to evaluate and treat    Diagnostics ordered/pending: HgbA1C to assess blood glucose levels, Lipid Profile to assess cholesterol levels, MRI head without contrast to assess brain parenchyma, TTE to assess cardiac function/status , TSH to assess thyroid function    VTE prophylaxis: None: Reason for No Pharmacological VTE Prophylaxis: hold for 24 hrs s/p TPA transfusion    BP parameters: Infarct: Post tPA, SBP <180

## 2018-06-02 NOTE — SUBJECTIVE & OBJECTIVE
No past medical history on file.  No past surgical history on file.  No family history on file.  Social History   Substance Use Topics    Smoking status: Not on file    Smokeless tobacco: Not on file    Alcohol use Not on file     Review of patient's allergies indicates:  Not on File    Medications: I have reviewed the current medication administration record.      (Not in a hospital admission)    Review of Systems   Constitutional: Negative for fever.   HENT: Negative for trouble swallowing.    Eyes:        Left eye blurry vision   Respiratory: Negative for shortness of breath.    Cardiovascular: Negative for chest pain.   Gastrointestinal: Negative for nausea and vomiting.   Genitourinary: Negative for hematuria and urgency.   Neurological: Positive for facial asymmetry, speech difficulty, weakness and headaches.   Psychiatric/Behavioral: Negative for confusion.     Objective:     Vital Signs (Most Recent):  Temp: 98.6 °F (37 °C) (06/01/18 1832)  Pulse: 70 (06/01/18 1917)  Resp: 18 (06/01/18 1917)  BP: (!) 180/82 (06/01/18 1917)  SpO2: 99 % (06/01/18 1917)    Vital Signs Range (Last 24H):  Temp:  [98 °F (36.7 °C)-98.6 °F (37 °C)]   Pulse:  [65-76]   Resp:  [16-20]   BP: (132-184)/(57-83)   SpO2:  [95 %-99 %]     Physical Exam   Constitutional: She is oriented to person, place, and time. She appears well-developed.   HENT:   Head: Normocephalic and atraumatic.   Eyes: EOM are normal. Pupils are equal, round, and reactive to light.   Cardiovascular: Normal rate.    Pulmonary/Chest: Effort normal.   Abdominal: Soft.   Musculoskeletal: Normal range of motion.   Neurological: She is alert and oriented to person, place, and time.   Skin: Skin is warm.   Psychiatric: She has a normal mood and affect.   Vitals reviewed.      Neurological Exam:   LOC: alert  Attention Span: Good   Language: No aphasia  Articulation: Dysarthria  Orientation: Person, Place, Time   Visual Fields: Full  EOM (CN III, IV, VI):  Full/intact  Pupils (CN II, III): PERRL  Facial Movement (CN VII): Lower facial weakness on the left  Motor: Arm left  Normal 5/5  Leg left  Normal 4/5  Arm right  Normal 5/5  Leg right Normal 5/5  Cebellar: No evidence of appendicular or axial ataxia  Sensation: Intact to light touch, temperature and vibration      Laboratory:  CMP: No results for input(s): GLUCOSE, CALCIUM, ALBUMIN, PROT, NA, K, CO2, CL, BUN, CREATININE, ALKPHOS, ALT, AST, BILITOT in the last 24 hours.  CBC:   Recent Labs  Lab 06/01/18 1912   WBC 7.87   RBC 4.28   HGB 13.0   HCT 38.5      MCV 90   MCH 30.4   MCHC 33.8     Lipid Panel: No results for input(s): CHOL, LDLCALC, HDL, TRIG in the last 168 hours.  Coagulation:   Recent Labs  Lab 06/01/18 1912   INR 1.1     Hgb A1C: No results for input(s): HGBA1C in the last 168 hours.  TSH: No results for input(s): TSH in the last 168 hours.    Diagnostic Results:      Brain imaging:  CTA MP 6/1  Normal multiphase CT angiogram of the head and neck.  No evidence of large vessel occlusion in the intracranial circulation or hemodynamically significant stenosis in the neck vessels.    No acute intracranial abnormality.  Please consider MRI of the brain for follow-up.        Cardiac Evaluation:   Echo pending

## 2018-06-02 NOTE — SUBJECTIVE & OBJECTIVE
Neurologic Chief Complaint: left sided weakness, dysarthria, aphasia     Subjective:     Interval History: Patient is seen for follow-up neurological assessment and treatment recommendations:     CHAR. Patient continues to have left sided headache/pressure. She reports that left eye blurry/double vision significantly improved. Speech improved compared to yesterday but still with slurring and stuttering today. She reports of no change with left sided weakness/numbness since yesterday. Her left leg gave out with therapy today. Family at bedside. Patient is pending MRI brain w/wo contrast today. TPA 24 hour window ends today @1800.         HPI, Past Medical, Family, and Social History remains the same as documented in the initial encounter.     Review of Systems   Positive for speech difficulty, left vision issues, and left sided weakness.  Negative for CP, SOB, or lethargy.     Scheduled Meds:   atorvastatin  40 mg Oral Daily    polyethylene glycol  17 g Oral Daily    sodium chloride 0.9%  3 mL Intravenous Q8H     Continuous Infusions:   sodium chloride 0.9% 75 mL/hr at 06/02/18 1100     PRN Meds:acetaminophen, dextrose 50%, glucagon (human recombinant), insulin aspart U-100, labetalol, magnesium oxide, magnesium oxide, ondansetron, potassium chloride 10%, potassium chloride 10%, potassium chloride 10%, potassium, sodium phosphates, potassium, sodium phosphates, potassium, sodium phosphates    Objective:     Vital Signs (Most Recent):  Temp: 98.5 °F (36.9 °C) (06/02/18 1100)  Pulse: (!) 57 (06/02/18 1100)  Resp: 20 (06/02/18 1100)  BP: (!) 182/90 (06/02/18 1100)  SpO2: 98 % (06/02/18 1100)       Vital Signs Range (Last 24H):  Temp:  [97.9 °F (36.6 °C)-98.6 °F (37 °C)]   Pulse:  [50-76]   Resp:  [13-23]   BP: (123-186)/(57-90)   SpO2:  [95 %-99 %]        Physical Exam  Vital signs: reviewed above.   Constitutional: well-developed, no acute distress  Cardiovascular: regular rate and rhythm  Resp: normal  respirations, not labored, no accessory muscles used  Abdomen: soft, non-distended, not tender to palpation  Pulses: palpable distal pulses   Skin: warm, dry and intact, no rashes  Neurological  GCS: Motor: 6/Verbal: 5/Eyes: 4 GCS Total: 15  Mental Status: Awake, Alert, Oriented x 4  Follows commands   Dysarthria and expressive aphasia present  Good attention span  Head: normocephalic, atraumatic       Visual Fields: left upper peripheral field deficit       EOM (CN III, IV, VI): Full/intact       Pupils (CN II, III): PERRL        Facial Sensation (CN V): decreased sensation on left       Facial Movement (CN VII): left facial weakness  Moves all extremities with good strength except for LLE 4+/5  LLE drift present  Finger to nose and heel/shin normally bilaterally (slightly slower heel/shin on LLE)   Decrease sensation to light touch on left side   Normal tone throughout    Shoulder shrug symmetric  Tongue midline, palate raises symmetrically   Left brow raise weak      Laboratory:  CMP:   Recent Labs  Lab 06/02/18  0337   CALCIUM 9.7   ALBUMIN 3.8   PROT 6.3      K 3.8   CO2 25      BUN 17   CREATININE 0.8   ALKPHOS 45*   ALT 25   AST 22   BILITOT 0.7     CBC:   Recent Labs  Lab 06/02/18 0337   WBC 7.55   RBC 4.09   HGB 12.5   HCT 37.4      MCV 91   MCH 30.6   MCHC 33.4     Lipid Panel:   Recent Labs  Lab 06/01/18 1912   CHOL 103*   LDLCALC 28.6*   HDL 48   TRIG 132     Coagulation:   Recent Labs  Lab 06/01/18 1912 06/02/18 0337   INR 1.1 1.1   APTT 24.4  --      Platelet Aggregation Study: No results for input(s): PLTAGG, PLTAGINTERP, PLTAGREGLACO, ADPPLTAGGREG in the last 168 hours.  Hgb A1C:   Recent Labs  Lab 06/01/18 1912   HGBA1C 7.9*     TSH:   Recent Labs  Lab 06/01/18 1912   TSH 1.819       Diagnostic Results     Brain/Vessel imaging:  CTA MP 6/1  Normal multiphase CT angiogram of the head and neck.  No evidence of large vessel occlusion in the intracranial circulation or  hemodynamically significant stenosis in the neck vessels.    No acute intracranial abnormality.  Please consider MRI of the brain for follow-up.         --Pending MRI brain w/o contrast 6/2/18          Cardiac Evaluation:   Echo pending

## 2018-06-02 NOTE — PLAN OF CARE
Problem: Patient Care Overview  Goal: Plan of Care Review    Recommendations     Recommendation/Intervention:   1. Recommend advancing diet to 2000 diabetic with dental soft texture per SLP recommendations.   2. If PO intake consistently <50% of meals, order Boost Glucose Control.  3. RD following.     Goals: Advancement of diet by RD follow up  Nutrition Goal Status: new

## 2018-06-02 NOTE — SUBJECTIVE & OBJECTIVE
No past medical history on file.  No past surgical history on file.   No current facility-administered medications on file prior to encounter.      No current outpatient prescriptions on file prior to encounter.      Allergies: Aspirin    No family history on file.  Social History   Substance Use Topics    Smoking status: Not on file    Smokeless tobacco: Not on file    Alcohol use Not on file     Review of Systems   Genitourinary: Negative for difficulty urinating and dysuria.        Review of symptoms  Constitutional: Denies fevers or chills.  Pulmonary: Denies shortness of breath or cough.  Cardiology: Denies chest pain or palpitations. Complains of intermittent chest tightness  GI: Denies abdominal pain or constipation.  Neurologic: Complains of L sided  Weakness and numbness,denies  headache  Objective:     Vitals:    Temp: 98.6 °F (37 °C)  Pulse: 62  BP: (!) 157/63  MAP (mmHg): 93  Resp: 20  SpO2: 96 %  O2 Device (Oxygen Therapy): room air    Temp  Min: 98 °F (36.7 °C)  Max: 98.6 °F (37 °C)  Pulse  Min: 62  Max: 76  BP  Min: 132/57  Max: 184/82  MAP (mmHg)  Min: 65  Max: 127  Resp  Min: 16  Max: 20  SpO2  Min: 95 %  Max: 99 %    No intake/output data recorded.           Physical Exam   Skin: Skin is warm and dry.       Physical Exam:  GA: Alert, comfortable, no acute distress.   HEENT: No scleral icterus or JVD.   Pulmonary: Clear to auscultation A/L. No wheezing, crackles, or rhonchi.  Cardiac: RRR S1 & S2 w/o rubs/murmurs/gallops.   Abdominal: Bowel sounds present x 4. No appreciable hepatosplenomegaly.  Skin: No jaundice, rashes, or visible lesions.  Neuro:  --GCS: E4 V5 M6  --Mental Status: AAOX4, follows commands, moves all extremities,dysarthric speach  --CN II-XII grossly intact.   --Pupils 3mm, PERRL.   --Corneal reflex, gag, cough intact.  --LUE strength: 4/5  --RUE strength: 5/5  --LLE strength: 4+/5  --RLE strength: 5/5    Today I personally reviewed pertinent medications, lines/drains/airways,  imaging, lab results,

## 2018-06-03 LAB
ALBUMIN SERPL BCP-MCNC: 3.7 G/DL
ALP SERPL-CCNC: 41 U/L
ALT SERPL W/O P-5'-P-CCNC: 21 U/L
ANION GAP SERPL CALC-SCNC: 10 MMOL/L
AST SERPL-CCNC: 19 U/L
BASOPHILS # BLD AUTO: 0.03 K/UL
BASOPHILS NFR BLD: 0.5 %
BILIRUB SERPL-MCNC: 0.6 MG/DL
BUN SERPL-MCNC: 13 MG/DL
CALCIUM SERPL-MCNC: 9.7 MG/DL
CHLORIDE SERPL-SCNC: 108 MMOL/L
CO2 SERPL-SCNC: 21 MMOL/L
CREAT SERPL-MCNC: 0.7 MG/DL
DIFFERENTIAL METHOD: NORMAL
EOSINOPHIL # BLD AUTO: 0.2 K/UL
EOSINOPHIL NFR BLD: 3.5 %
ERYTHROCYTE [DISTWIDTH] IN BLOOD BY AUTOMATED COUNT: 11.6 %
EST. GFR  (AFRICAN AMERICAN): >60 ML/MIN/1.73 M^2
EST. GFR  (NON AFRICAN AMERICAN): >60 ML/MIN/1.73 M^2
GLUCOSE SERPL-MCNC: 169 MG/DL
HCT VFR BLD AUTO: 37.3 %
HGB BLD-MCNC: 12.4 G/DL
IMM GRANULOCYTES # BLD AUTO: 0.03 K/UL
IMM GRANULOCYTES NFR BLD AUTO: 0.5 %
INR PPP: 1.1
LYMPHOCYTES # BLD AUTO: 1.9 K/UL
LYMPHOCYTES NFR BLD: 32.5 %
MAGNESIUM SERPL-MCNC: 2 MG/DL
MCH RBC QN AUTO: 30.2 PG
MCHC RBC AUTO-ENTMCNC: 33.2 G/DL
MCV RBC AUTO: 91 FL
MONOCYTES # BLD AUTO: 0.6 K/UL
MONOCYTES NFR BLD: 11.2 %
NEUTROPHILS # BLD AUTO: 3 K/UL
NEUTROPHILS NFR BLD: 51.8 %
NRBC BLD-RTO: 0 /100 WBC
PHOSPHATE SERPL-MCNC: 2.6 MG/DL
PLATELET # BLD AUTO: 254 K/UL
PMV BLD AUTO: 10.3 FL
POCT GLUCOSE: 161 MG/DL (ref 70–110)
POCT GLUCOSE: 188 MG/DL (ref 70–110)
POCT GLUCOSE: 200 MG/DL (ref 70–110)
POCT GLUCOSE: 254 MG/DL (ref 70–110)
POTASSIUM SERPL-SCNC: 4.3 MMOL/L
PROT SERPL-MCNC: 6.4 G/DL
PROTHROMBIN TIME: 11.4 SEC
RBC # BLD AUTO: 4.1 M/UL
SODIUM SERPL-SCNC: 139 MMOL/L
WBC # BLD AUTO: 5.7 K/UL

## 2018-06-03 PROCEDURE — 25000003 PHARM REV CODE 250: Performed by: NURSE PRACTITIONER

## 2018-06-03 PROCEDURE — 25000003 PHARM REV CODE 250: Performed by: PHYSICIAN ASSISTANT

## 2018-06-03 PROCEDURE — 84100 ASSAY OF PHOSPHORUS: CPT

## 2018-06-03 PROCEDURE — 25000003 PHARM REV CODE 250: Performed by: STUDENT IN AN ORGANIZED HEALTH CARE EDUCATION/TRAINING PROGRAM

## 2018-06-03 PROCEDURE — 63600175 PHARM REV CODE 636 W HCPCS: Performed by: PSYCHIATRY & NEUROLOGY

## 2018-06-03 PROCEDURE — 85025 COMPLETE CBC W/AUTO DIFF WBC: CPT

## 2018-06-03 PROCEDURE — 85610 PROTHROMBIN TIME: CPT

## 2018-06-03 PROCEDURE — 20600001 HC STEP DOWN PRIVATE ROOM

## 2018-06-03 PROCEDURE — 99233 SBSQ HOSP IP/OBS HIGH 50: CPT | Mod: ,,, | Performed by: PSYCHIATRY & NEUROLOGY

## 2018-06-03 PROCEDURE — 80053 COMPREHEN METABOLIC PANEL: CPT

## 2018-06-03 PROCEDURE — 94761 N-INVAS EAR/PLS OXIMETRY MLT: CPT

## 2018-06-03 PROCEDURE — 83735 ASSAY OF MAGNESIUM: CPT

## 2018-06-03 PROCEDURE — 25000003 PHARM REV CODE 250: Performed by: PSYCHIATRY & NEUROLOGY

## 2018-06-03 PROCEDURE — A4216 STERILE WATER/SALINE, 10 ML: HCPCS | Performed by: NURSE PRACTITIONER

## 2018-06-03 RX ORDER — METHYLPREDNISOLONE 4 MG/1
4 TABLET ORAL ONCE
Status: DISCONTINUED | OUTPATIENT
Start: 2018-06-04 | End: 2018-06-04 | Stop reason: HOSPADM

## 2018-06-03 RX ORDER — VALACYCLOVIR HYDROCHLORIDE 500 MG/1
1000 TABLET, FILM COATED ORAL 3 TIMES DAILY
Status: DISCONTINUED | OUTPATIENT
Start: 2018-06-03 | End: 2018-06-04 | Stop reason: HOSPADM

## 2018-06-03 RX ORDER — METHYLPREDNISOLONE 4 MG/1
4 TABLET ORAL ONCE
Status: COMPLETED | OUTPATIENT
Start: 2018-06-08 | End: 2018-06-04

## 2018-06-03 RX ORDER — METHYLPREDNISOLONE 4 MG/1
8 TABLET ORAL ONCE
Status: COMPLETED | OUTPATIENT
Start: 2018-06-03 | End: 2018-06-03

## 2018-06-03 RX ORDER — METHYLPREDNISOLONE 4 MG/1
4 TABLET ORAL ONCE
Status: DISCONTINUED | OUTPATIENT
Start: 2018-06-06 | End: 2018-06-04 | Stop reason: HOSPADM

## 2018-06-03 RX ORDER — METHYLPREDNISOLONE 4 MG/1
4 TABLET ORAL ONCE
Status: DISCONTINUED | OUTPATIENT
Start: 2018-06-05 | End: 2018-06-04 | Stop reason: HOSPADM

## 2018-06-03 RX ORDER — METHYLPREDNISOLONE 4 MG/1
4 TABLET ORAL ONCE
Status: COMPLETED | OUTPATIENT
Start: 2018-06-03 | End: 2018-06-03

## 2018-06-03 RX ORDER — METHYLPREDNISOLONE 4 MG/1
8 TABLET ORAL ONCE
Status: COMPLETED | OUTPATIENT
Start: 2018-06-04 | End: 2018-06-03

## 2018-06-03 RX ORDER — METHYLPREDNISOLONE 4 MG/1
4 TABLET ORAL ONCE
Status: DISCONTINUED | OUTPATIENT
Start: 2018-06-07 | End: 2018-06-04 | Stop reason: HOSPADM

## 2018-06-03 RX ORDER — GABAPENTIN 300 MG/1
300 CAPSULE ORAL 3 TIMES DAILY
Status: DISCONTINUED | OUTPATIENT
Start: 2018-06-03 | End: 2018-06-04 | Stop reason: HOSPADM

## 2018-06-03 RX ORDER — METHYLPREDNISOLONE 4 MG/1
4 TABLET ORAL ONCE
Status: DISCONTINUED | OUTPATIENT
Start: 2018-06-03 | End: 2018-06-04 | Stop reason: HOSPADM

## 2018-06-03 RX ORDER — METHYLPREDNISOLONE 4 MG/1
4 TABLET ORAL ONCE
Status: COMPLETED | OUTPATIENT
Start: 2018-06-04 | End: 2018-06-04

## 2018-06-03 RX ADMIN — METHYLPREDNISOLONE 8 MG: 4 TABLET ORAL at 10:06

## 2018-06-03 RX ADMIN — POLYETHYLENE GLYCOL 3350 17 G: 17 POWDER, FOR SOLUTION ORAL at 08:06

## 2018-06-03 RX ADMIN — Medication 3 ML: at 05:06

## 2018-06-03 RX ADMIN — INSULIN ASPART 2 UNITS: 100 INJECTION, SOLUTION INTRAVENOUS; SUBCUTANEOUS at 05:06

## 2018-06-03 RX ADMIN — GABAPENTIN 300 MG: 300 CAPSULE ORAL at 08:06

## 2018-06-03 RX ADMIN — INSULIN ASPART 3 UNITS: 100 INJECTION, SOLUTION INTRAVENOUS; SUBCUTANEOUS at 10:06

## 2018-06-03 RX ADMIN — LOSARTAN POTASSIUM 50 MG: 50 TABLET, FILM COATED ORAL at 08:06

## 2018-06-03 RX ADMIN — Medication 3 ML: at 02:06

## 2018-06-03 RX ADMIN — METHYLPREDNISOLONE 4 MG: 4 TABLET ORAL at 02:06

## 2018-06-03 RX ADMIN — METHYLPREDNISOLONE 8 MG: 4 TABLET ORAL at 12:06

## 2018-06-03 RX ADMIN — LABETALOL HYDROCHLORIDE 10 MG: 5 INJECTION, SOLUTION INTRAVENOUS at 12:06

## 2018-06-03 RX ADMIN — ATORVASTATIN CALCIUM 40 MG: 20 TABLET, FILM COATED ORAL at 08:06

## 2018-06-03 RX ADMIN — INSULIN ASPART 2 UNITS: 100 INJECTION, SOLUTION INTRAVENOUS; SUBCUTANEOUS at 12:06

## 2018-06-03 RX ADMIN — LABETALOL HYDROCHLORIDE 10 MG: 5 INJECTION, SOLUTION INTRAVENOUS at 07:06

## 2018-06-03 RX ADMIN — VALACYCLOVIR HYDROCHLORIDE 1000 MG: 500 TABLET, FILM COATED ORAL at 02:06

## 2018-06-03 RX ADMIN — VALACYCLOVIR HYDROCHLORIDE 1000 MG: 500 TABLET, FILM COATED ORAL at 10:06

## 2018-06-03 RX ADMIN — ACETAMINOPHEN 650 MG: 325 TABLET ORAL at 08:06

## 2018-06-03 RX ADMIN — ACETAMINOPHEN 650 MG: 325 TABLET ORAL at 07:06

## 2018-06-03 RX ADMIN — INSULIN ASPART 1 UNITS: 100 INJECTION, SOLUTION INTRAVENOUS; SUBCUTANEOUS at 12:06

## 2018-06-03 RX ADMIN — Medication 3 ML: at 10:06

## 2018-06-03 NOTE — ASSESSMENT & PLAN NOTE
Carlos from [facility] for CTA MP for possible intervention. Dr. Rapp completed a telestroke on the patient and TPA was given at 1647. Neurological symptoms consisted of LSW and numbness with patient complaining weakness in her face and tongue. Patient was transferred for CTA MP which did not reveal a LVO therefore is not an IR candidate. On exam patient has LLE drift and c/o of blurry vision in her left eye with a HA in that area. No hemianopsia appreciated on exam. Patient is having right facial twitching mainly with her right eye. She states her speech is slurred and she is also stuttering. Not aphasic. Admit to [facility] s/p TPA infusion (started 6/1/18 1647) and for close neurological monitoring.     NAEON. Patient continues to have left sided headache/pressure, LSW, and speech difficulty. TPA 24 hour window ended 06/02 @1800.  MRI Brain negative for acute findings.  The patient's continued symptoms in the setting of negative brain imaging could be related to Bell's Palsy, conversion disorder.  The patient was started on steroids in [facility].        Antithrombotics for secondary stroke prevention:  Patient is allergic to aspirin.  May need to consider Plavix.    Statins for secondary stroke prevention and hyperlipidemia, if present:   Statins: Atorvastatin- 40 mg daily; LDL 28.6    Aggressive risk factor modification: HTN, DM, HLD, CAD     Rehab efforts: PT/OT/SLP to evaluate and treat   PT/OT recommend Rehab  SLP recommends dental soft, thin liquids    Diagnostics ordered/pending: HgbA1C to assess blood glucose levels, Lipid Profile to assess cholesterol levels, MRI head without contrast to assess brain parenchyma, TTE to assess cardiac function/status , TSH to assess thyroid function    VTE prophylaxis: Heparin 5000 units SQ every 8 hours  SCDs    BP parameters: Infarct: Post tPA, SBP <180

## 2018-06-03 NOTE — PROGRESS NOTES
[facility]  Vascular Neurology  Comprehensive Stroke Center  Progress Note    Assessment/Plan:     * Status post administration of tPA (rtPA) in a different facility within the last 24 hours prior to admission to current facility    TPA started @ 1647 on 6/1  Admitted to [facility] s/p TPA administration.  Post-tPA administration monitoring ended at 1800 on 6/2/18.        Mixed hyperlipidemia    Stroke risk factor  LDL 28.6  Atorvastatin 40 mg daily; Could lower dose to 20 mg daily in setting of diabetes without evidence of atherosclerotic or vascular disease on imaging.        Type 2 diabetes mellitus    Stroke risk factor   Hgb A1C 7.9  Home meds include lantus and metformin  POCT and SSI while admitted, titrate prn        Essential hypertension    Stroke risk factor  SBP <180  Management per primary team   -recommend resuming home medications         Thrombotic stroke involving right middle cerebral artery    Carlos from [facility] for CTA MP for possible intervention. Dr. Rapp completed a telestroke on the patient and TPA was given at 1647. Neurological symptoms consisted of LSW and numbness with patient complaining weakness in her face and tongue. Patient was transferred for CTA MP which did not reveal a LVO therefore is not an IR candidate. On exam patient has LLE drift and c/o of blurry vision in her left eye with a HA in that area. No hemianopsia appreciated on exam. Patient is having right facial twitching mainly with her right eye. She states her speech is slurred and she is also stuttering. Not aphasic. Admit to [facility] s/p TPA infusion (started 6/1/18 1647) and for close neurological monitoring.     NAEON. Patient continues to have left sided headache/pressure, LSW, and speech difficulty. TPA 24 hour window ended 06/02 @1800.  MRI Brain negative for acute findings.  The patient's continued symptoms in the setting of negative brain imaging could be related to Bell's Palsy, conversion  disorder.  The patient was started on steroids in [facility].        Antithrombotics for secondary stroke prevention:  Patient is allergic to aspirin.  May need to consider Plavix.    Statins for secondary stroke prevention and hyperlipidemia, if present:   Statins: Atorvastatin- 40 mg daily; LDL 28.6    Aggressive risk factor modification: HTN, DM, HLD, CAD     Rehab efforts: PT/OT/SLP to evaluate and treat   PT/OT recommend Rehab  SLP recommends dental soft, thin liquids    Diagnostics ordered/pending: HgbA1C to assess blood glucose levels, Lipid Profile to assess cholesterol levels, MRI head without contrast to assess brain parenchyma, TTE to assess cardiac function/status , TSH to assess thyroid function    VTE prophylaxis: Heparin 5000 units SQ every 8 hours  SCDs    BP parameters: Infarct: Post tPA, SBP <180                 06/03/2018 NAEON.  Patient remains w/speech difficulty, left lower extremity weakness.  C/o numbness to the left fingers, left head and left neck pain that was relieved w/gabapentin and tylenol.      STROKE DOCUMENTATION   Acute Stroke Times   Last Known Normal Date: 06/01/18  Last Known Normal Time: 1430  Symptom Onset Date: 06/01/18  Symptom Onset Time: 1430  Stroke Team Called Date: 06/01/18  Stroke Team Called Time: 1836  Stroke Team Arrival Date: 06/01/18  Stroke Team Arrival Time: 1840  CT Interpretation Time: 1857  Decision to Treat Time for Alteplase: 1647  Decision to Treat Time for IR:  (No LVO)    NIH Scale:  1a. Level Of Consciousness: 0-->Alert: keenly responsive  1b. LOC Questions: 0-->Answers both questions correctly  1c. LOC Commands: 0-->Performs both tasks correctly  2. Best Gaze: 0-->Normal  3. Visual: 0-->No visual loss  4. Facial Palsy: 2-->Partial paralysis (total or near-total paralysis of lower face)  5a. Motor Arm, Left: 1-->Drift: limb holds 90 (or 45) degrees, but drifts down before full 10 seconds: does not hit bed or other support  5b. Motor Arm, Right: 0-->No drift:  limb holds 90 (or 45) degrees for full 10 secs  6a. Motor Leg, Left: 0-->No drift: leg holds 30 degree position for full 5 secs  6b. Motor Leg, Right: 0-->No drift: leg holds 30 degree position for full 5 secs  7. Limb Ataxia: 0-->Absent  8. Sensory: 1-->Mild-to-moderate sensory loss: patient feels pinprick is less sharp or is dull on the affected side: or there is a loss of superficial pain with pinprick, but patient is aware of being touched (LLE)  9. Best Language: 0-->No aphasia: normal  10. Dysarthria: 1-->Mild-to-moderate dysarthria: patient slurs at least some words and, at worst, can be understood with some difficulty (stuttering/stammering speech pattern)  11. Extinction and Inattention (formerly Neglect): 0-->No abnormality  Total (NIH Stroke Scale): 5       Modified [place] Score: 0  [place] Coma Scale:15   ABCD2 Score:    [id number] Score:   HAS -BLED Score:   ICH Score:   Hunt & Galvin Classification:      Hemorrhagic change of an Ischemic Stroke: Does this patient have an ischemic stroke with hemorrhagic changes? No     Neurologic Chief Complaint:  Left sided weakness, dysarthria, aphasia    Subjective:     Interval History: Patient is seen for follow-up neurological assessment and treatment recommendations: CHAR.  Patient states 3/10 left sided headache/pressure.  Denies any difficulty w/vision.  States speech is improving but not at baseline.  PT/OT recommend Rehab.    HPI, Past Medical, Family, and Social History remains the same as documented in the initial encounter.     Review of Systems   Constitutional: Negative for chills and fever.   HENT: Negative for drooling, sore throat and trouble swallowing.    Eyes: Negative for redness and visual disturbance.   Respiratory: Negative for cough and shortness of breath.    Cardiovascular: Negative for chest pain and palpitations.   Gastrointestinal: Negative for abdominal pain, nausea and vomiting.   Genitourinary: Negative for dysuria and hematuria.    Musculoskeletal: Positive for neck pain. Negative for neck stiffness.   Skin: Negative for rash.   Neurological: Positive for facial asymmetry, speech difficulty, weakness, numbness and headaches. Negative for dizziness, tremors and light-headedness.   Psychiatric/Behavioral: Negative for agitation and confusion.     Scheduled Meds:   atorvastatin  40 mg Oral Daily    gabapentin  300 mg Oral BID    losartan  50 mg Oral Daily    methylPREDNISolone  4 mg Oral Once    Followed by    methylPREDNISolone  4 mg Oral Once    Followed by    methylPREDNISolone  8 mg Oral Once    Followed by    [START ON 6/4/2018] methylPREDNISolone  4 mg Oral Once    Followed by    [START ON 6/4/2018] methylPREDNISolone  4 mg Oral Once    Followed by    [START ON 6/4/2018] methylPREDNISolone  4 mg Oral Once    Followed by    [START ON 6/4/2018] methylPREDNISolone  8 mg Oral Once    Followed by    [START ON 6/5/2018] methylPREDNISolone  4 mg Oral Once    Followed by    [START ON 6/5/2018] methylPREDNISolone  4 mg Oral Once    Followed by    [START ON 6/5/2018] methylPREDNISolone  4 mg Oral Once    Followed by    [START ON 6/5/2018] methylPREDNISolone  4 mg Oral Once    Followed by    [START ON 6/6/2018] methylPREDNISolone  4 mg Oral Once    Followed by    [START ON 6/6/2018] methylPREDNISolone  4 mg Oral Once    Followed by    [START ON 6/6/2018] methylPREDNISolone  4 mg Oral Once    Followed by    [START ON 6/7/2018] methylPREDNISolone  4 mg Oral Once    Followed by    [START ON 6/7/2018] methylPREDNISolone  4 mg Oral Once    Followed by    [START ON 6/8/2018] methylPREDNISolone  4 mg Oral Once    polyethylene glycol  17 g Oral Daily    sodium chloride 0.9%  3 mL Intravenous Q8H    valACYclovir  1,000 mg Oral TID     Continuous Infusions:  PRN Meds:acetaminophen, dextrose 50%, glucagon (human recombinant), insulin aspart U-100, labetalol, magnesium oxide, magnesium oxide, ondansetron, potassium chloride 10%, potassium  chloride 10%, potassium chloride 10%, potassium, sodium phosphates, potassium, sodium phosphates, potassium, sodium phosphates    Objective:     Vital Signs (Most Recent):  Temp: 98.6 °F (37 °C) (06/03/18 1100)  Pulse: (!) 59 (06/03/18 1300)  Resp: 18 (06/03/18 1300)  BP: (!) 184/84 (06/03/18 1300)  SpO2: 97 % (06/03/18 1300)       Vital Signs Range (Last 24H):  Temp:  [98 °F (36.7 °C)-98.6 °F (37 °C)]   Pulse:  [51-74]   Resp:  [13-29]   BP: (113-191)/()   SpO2:  [95 %-100 %]        Physical Exam   Constitutional: She is oriented to person, place, and time. She appears well-developed and well-nourished. No distress.   HENT:   Head: Normocephalic and atraumatic.   Right Ear: External ear normal.   Left Ear: External ear normal.   Eyes: Conjunctivae and EOM are normal. Pupils are equal, round, and reactive to light. Right eye exhibits no discharge. Left eye exhibits no discharge. No scleral icterus.   Neck: Normal range of motion. Neck supple. No thyromegaly present.   Cardiovascular: Normal rate and regular rhythm.    Pulmonary/Chest: Effort normal and breath sounds normal. No respiratory distress.   Abdominal: Soft. Bowel sounds are normal. She exhibits no distension. There is no tenderness.   Musculoskeletal: She exhibits no edema.   Lymphadenopathy:     She has no cervical adenopathy.   Neurological: She is alert and oriented to person, place, and time.   Skin: Skin is warm and dry. Capillary refill takes less than 2 seconds. She is not diaphoretic.   Nursing note and vitals reviewed.      Neurological Exam:   LOC: alert  Attention Span: Good   Language: No aphasia  Articulation: stuttering/stammering speech pattern  Orientation: Person, Place, Time   EOM (CN III, IV, VI): Full/intact  Facial Movement (CN VII): Upper & lower facial weakness on the Left  Motor: Arm left  Normal 5/5  Leg left  Paresis: 4/5  Arm right  Normal 5/5  Leg right Normal 5/5  Cebellar: No evidence of appendicular or axial  ataxia  Sensation: Ivan-anesthesia left lower extremity    Laboratory:  CMP:   Recent Labs  Lab 06/03/18  0516   CALCIUM 9.7   ALBUMIN 3.7   PROT 6.4      K 4.3   CO2 21*      BUN 13   CREATININE 0.7   ALKPHOS 41*   ALT 21   AST 19   BILITOT 0.6     CBC:   Recent Labs  Lab 06/03/18 0516   WBC 5.70   RBC 4.10   HGB 12.4   HCT 37.3      MCV 91   MCH 30.2   MCHC 33.2     Lipid Panel:   Recent Labs  Lab 06/01/18 1912   CHOL 103*   LDLCALC 28.6*   HDL 48   TRIG 132     Coagulation:   Recent Labs  Lab 06/01/18 1912 06/03/18 0516   INR 1.1  < > 1.1   APTT 24.4  --   --    < > = values in this interval not displayed.  Hgb A1C:   Recent Labs  Lab 06/01/18 1912   HGBA1C 7.9*     TSH:   Recent Labs  Lab 06/01/18 1912   TSH 1.819       Diagnostic Results     Brain Imaging   MRI Brain 6/02/18  Unremarkable motion limited noncontrast MRI brain specifically without evidence for acute infarction or hydrocephalus.    Vessel Imaging   CTA MP 6/01/18 Normal multiphase CT angiogram of the head and neck.  No evidence of large vessel occlusion in the intracranial circulation or hemodynamically significant stenosis in the neck vessels.  No acute intracranial abnormality.  Please consider MRI of the brain for follow-up.    Cardiac Imaging   2D Echo 6/02/18   CONCLUSIONS     1 - Normal left ventricular systolic function (EF 60-65%).     2 - No wall motion abnormalities.     3 - Normal left ventricular diastolic function.     4 - Normal right ventricular systolic function .     5 - The estimated PA systolic pressure is 24 mmHg.     6 - Mild tricuspid regurgitation.       Rosa Hercules, STEF, NP  Comprehensive Stroke Center  Department of Vascular Neurology   [facility]

## 2018-06-03 NOTE — ASSESSMENT & PLAN NOTE
TPA started @ 1647 on 6/1  Admitted to [facility] s/p TPA administration.  Post-tPA administration monitoring ended at 1800 on 6/2/18.

## 2018-06-03 NOTE — PLAN OF CARE
Problem: Patient Care Overview  Goal: Plan of Care Review  Outcome: Ongoing (interventions implemented as appropriate)  POC reviewed with pt and family at 1400. Pt verbalized understanding. Questions and concerns addressed. No acute events today. Pt progressing toward goals. Will continue to monitor. See flowsheets for full assessment and VS info.     Steroids/antivirals started today to treat Bell's Palsy.  Pending transfer to stroke service.

## 2018-06-03 NOTE — ASSESSMENT & PLAN NOTE
Stroke risk factor   Hgb A1C 7.9  Home meds include lantus and metformin  POCT and SSI while admitted, titrate prn

## 2018-06-03 NOTE — SUBJECTIVE & OBJECTIVE
Neurologic Chief Complaint:  Left sided weakness, dysarthria, aphasia    Subjective:     Interval History: Patient is seen for follow-up neurological assessment and treatment recommendations: CHAR.  Patient states 3/10 left sided headache/pressure.  Denies any difficulty w/vision.  States speech is improving but not at baseline.  PT/OT recommend Rehab.    HPI, Past Medical, Family, and Social History remains the same as documented in the initial encounter.     Review of Systems   Constitutional: Negative for chills and fever.   HENT: Negative for drooling, sore throat and trouble swallowing.    Eyes: Negative for redness and visual disturbance.   Respiratory: Negative for cough and shortness of breath.    Cardiovascular: Negative for chest pain and palpitations.   Gastrointestinal: Negative for abdominal pain, nausea and vomiting.   Genitourinary: Negative for dysuria and hematuria.   Musculoskeletal: Positive for neck pain. Negative for neck stiffness.   Skin: Negative for rash.   Neurological: Positive for facial asymmetry, speech difficulty, weakness, numbness and headaches. Negative for dizziness, tremors and light-headedness.   Psychiatric/Behavioral: Negative for agitation and confusion.     Scheduled Meds:   atorvastatin  40 mg Oral Daily    gabapentin  300 mg Oral BID    losartan  50 mg Oral Daily    methylPREDNISolone  4 mg Oral Once    Followed by    methylPREDNISolone  4 mg Oral Once    Followed by    methylPREDNISolone  8 mg Oral Once    Followed by    [START ON 6/4/2018] methylPREDNISolone  4 mg Oral Once    Followed by    [START ON 6/4/2018] methylPREDNISolone  4 mg Oral Once    Followed by    [START ON 6/4/2018] methylPREDNISolone  4 mg Oral Once    Followed by    [START ON 6/4/2018] methylPREDNISolone  8 mg Oral Once    Followed by    [START ON 6/5/2018] methylPREDNISolone  4 mg Oral Once    Followed by    [START ON 6/5/2018] methylPREDNISolone  4 mg Oral Once    Followed by    [START  ON 6/5/2018] methylPREDNISolone  4 mg Oral Once    Followed by    [START ON 6/5/2018] methylPREDNISolone  4 mg Oral Once    Followed by    [START ON 6/6/2018] methylPREDNISolone  4 mg Oral Once    Followed by    [START ON 6/6/2018] methylPREDNISolone  4 mg Oral Once    Followed by    [START ON 6/6/2018] methylPREDNISolone  4 mg Oral Once    Followed by    [START ON 6/7/2018] methylPREDNISolone  4 mg Oral Once    Followed by    [START ON 6/7/2018] methylPREDNISolone  4 mg Oral Once    Followed by    [START ON 6/8/2018] methylPREDNISolone  4 mg Oral Once    polyethylene glycol  17 g Oral Daily    sodium chloride 0.9%  3 mL Intravenous Q8H    valACYclovir  1,000 mg Oral TID     Continuous Infusions:  PRN Meds:acetaminophen, dextrose 50%, glucagon (human recombinant), insulin aspart U-100, labetalol, magnesium oxide, magnesium oxide, ondansetron, potassium chloride 10%, potassium chloride 10%, potassium chloride 10%, potassium, sodium phosphates, potassium, sodium phosphates, potassium, sodium phosphates    Objective:     Vital Signs (Most Recent):  Temp: 98.6 °F (37 °C) (06/03/18 1100)  Pulse: (!) 59 (06/03/18 1300)  Resp: 18 (06/03/18 1300)  BP: (!) 184/84 (06/03/18 1300)  SpO2: 97 % (06/03/18 1300)       Vital Signs Range (Last 24H):  Temp:  [98 °F (36.7 °C)-98.6 °F (37 °C)]   Pulse:  [51-74]   Resp:  [13-29]   BP: (113-191)/()   SpO2:  [95 %-100 %]        Physical Exam   Constitutional: She is oriented to person, place, and time. She appears well-developed and well-nourished. No distress.   HENT:   Head: Normocephalic and atraumatic.   Right Ear: External ear normal.   Left Ear: External ear normal.   Eyes: Conjunctivae and EOM are normal. Pupils are equal, round, and reactive to light. Right eye exhibits no discharge. Left eye exhibits no discharge. No scleral icterus.   Neck: Normal range of motion. Neck supple. No thyromegaly present.   Cardiovascular: Normal rate and regular rhythm.     Pulmonary/Chest: Effort normal and breath sounds normal. No respiratory distress.   Abdominal: Soft. Bowel sounds are normal. She exhibits no distension. There is no tenderness.   Musculoskeletal: She exhibits no edema.   Lymphadenopathy:     She has no cervical adenopathy.   Neurological: She is alert and oriented to person, place, and time.   Skin: Skin is warm and dry. Capillary refill takes less than 2 seconds. She is not diaphoretic.   Nursing note and vitals reviewed.      Neurological Exam:   LOC: alert  Attention Span: Good   Language: No aphasia  Articulation: stuttering/stammering speech pattern  Orientation: Person, Place, Time   EOM (CN III, IV, VI): Full/intact  Facial Movement (CN VII): Upper & lower facial weakness on the Left  Motor: Arm left  Normal 5/5  Leg left  Paresis: 4/5  Arm right  Normal 5/5  Leg right Normal 5/5  Cebellar: No evidence of appendicular or axial ataxia  Sensation: Ivan-anesthesia left lower extremity    Laboratory:  CMP:   Recent Labs  Lab 06/03/18  0516   CALCIUM 9.7   ALBUMIN 3.7   PROT 6.4      K 4.3   CO2 21*      BUN 13   CREATININE 0.7   ALKPHOS 41*   ALT 21   AST 19   BILITOT 0.6     CBC:   Recent Labs  Lab 06/03/18  0516   WBC 5.70   RBC 4.10   HGB 12.4   HCT 37.3      MCV 91   MCH 30.2   MCHC 33.2     Lipid Panel:   Recent Labs  Lab 06/01/18 1912   CHOL 103*   LDLCALC 28.6*   HDL 48   TRIG 132     Coagulation:   Recent Labs  Lab 06/01/18 1912 06/03/18  0516   INR 1.1  < > 1.1   APTT 24.4  --   --    < > = values in this interval not displayed.  Hgb A1C:   Recent Labs  Lab 06/01/18 1912   HGBA1C 7.9*     TSH:   Recent Labs  Lab 06/01/18 1912   TSH 1.819       Diagnostic Results     Brain Imaging   MRI Brain 6/02/18  Unremarkable motion limited noncontrast MRI brain specifically without evidence for acute infarction or hydrocephalus.    Vessel Imaging   CTA MP 6/01/18 Normal multiphase CT angiogram of the head and neck.  No evidence of large  vessel occlusion in the intracranial circulation or hemodynamically significant stenosis in the neck vessels.  No acute intracranial abnormality.  Please consider MRI of the brain for follow-up.    Cardiac Imaging   2D Echo 6/02/18   CONCLUSIONS     1 - Normal left ventricular systolic function (EF 60-65%).     2 - No wall motion abnormalities.     3 - Normal left ventricular diastolic function.     4 - Normal right ventricular systolic function .     5 - The estimated PA systolic pressure is 24 mmHg.     6 - Mild tricuspid regurgitation.

## 2018-06-03 NOTE — ASSESSMENT & PLAN NOTE
Stroke risk factor  LDL 28.6  Atorvastatin 40 mg daily; Could lower dose to 20 mg daily in setting of diabetes without evidence of atherosclerotic or vascular disease on imaging.

## 2018-06-03 NOTE — NURSING TRANSFER
Nursing Transfer Note      6/3/2018     Transfer To: 748A    Transfer via bed    Transfer with cardiac monitoring    Transported by RN and PCT    Medicines sent: insulin, methylprednisolone    Chart send with patient: Yes    Notified: daughter    Patient reassessed at: 6/3/18 1700 (date, time)    Upon arrival to floor: cardiac monitor applied, patient oriented to room, call bell in reach and bed in lowest position

## 2018-06-03 NOTE — PROGRESS NOTES
[facility]  Neurocritical Care  Progress Note    Admit Date: 6/1/2018  Service Date: 06/03/2018  Length of Stay: 2    Subjective:     Chief Complaint: Status post administration of tPA (rtPA) in a different facility within the last 24 hours prior to admission to current facility    History of Present Illness: The patient is a 72 year old F with PMHx of DM, CAD,  HTN, TIA and Von Willebrand disease transferred to [facility] s/p tPA for R MCA syndrome. Last known normal  around 14:30 pm. She stated her speech became very slurred, L sided weakness and numbness and she could not drink water. Initial NIHSS 5. CTMP -no LVO.  She states her speech is still slurred and she is also stuttering, however complains of L facial and L sided numbness to light touch. Patient denies vision disturbance, headache, SOB, or N/V/D. Patient admitted to [facility] for close monitoring and higher level of care.     Hospital Course: 6:1: Pt admitted to [facility], pending MRI 6/2 6/2: MRI negative for ischemic stroke. Clinical history suggestive of bell's palsy  6/3: no acute events overnight  [facility]  Neurocritical Care  Progress Note    Admit Date: 6/1/2018  Service Date: 06/03/2018  Length of Stay: 2    Subjective:     Chief Complaint: Status post administration of tPA (rtPA) in a different facility within the last 24 hours prior to admission to current facility    History of Present Illness: The patient is a 72 year old F with PMHx of DM, CAD,  HTN, TIA and Von Willebrand disease transferred to [facility] from [facility] s/p tPA for R MCA syndrome. Last known normal  around 14:30 pm. She stated her speech became very slurred, L sided weakness and numbness and she could not drink water. Initial NIHSS 5. CTMP -no LVO.  She states her speech is still slurred and she is also stuttering, however complains of L facial and L sided numbness to  light touch. Patient denies vision disturbance, headache, SOB, or N/V/D. Patient admitted to [facility] for close monitoring and higher level of care.     Hospital Course: 6:1: Pt admitted to [facility], pending MRI 6/2 6/2: MRI negative for ischemic stroke. Clinical history suggestive of bell's palsy  6/3: no acute events overnight      SUBJECTIVE:     Interval History/Significant Events: no acute events overnight    Review of Symptoms:   Constitutional: Denies fevers or chills.  Pulmonary: Denies shortness of breath or cough.  Cardiology: Denies chest pain or palpitations.  GI: Denies abdominal pain or constipation.  Neurologic: Denies new weakness, headaches, or paresthesias.     Medications:  Continuous Infusions:    Scheduled Meds:   atorvastatin  40 mg Oral Daily    gabapentin  300 mg Oral BID    losartan  50 mg Oral Daily    methylPREDNISolone  8 mg Oral Once    Followed by    methylPREDNISolone  4 mg Oral Once    Followed by    methylPREDNISolone  4 mg Oral Once    Followed by    methylPREDNISolone  8 mg Oral Once    Followed by    [START ON 6/4/2018] methylPREDNISolone  4 mg Oral Once    Followed by    [START ON 6/4/2018] methylPREDNISolone  4 mg Oral Once    Followed by    [START ON 6/4/2018] methylPREDNISolone  4 mg Oral Once    Followed by    [START ON 6/4/2018] methylPREDNISolone  8 mg Oral Once    Followed by    [START ON 6/5/2018] methylPREDNISolone  4 mg Oral Once    Followed by    [START ON 6/5/2018] methylPREDNISolone  4 mg Oral Once    Followed by    [START ON 6/5/2018] methylPREDNISolone  4 mg Oral Once    Followed by    [START ON 6/5/2018] methylPREDNISolone  4 mg Oral Once    Followed by    [START ON 6/6/2018] methylPREDNISolone  4 mg Oral Once    Followed by    [START ON 6/6/2018] methylPREDNISolone  4 mg Oral Once    Followed by    [START ON 6/6/2018] methylPREDNISolone  4 mg Oral Once    Followed by    [START ON 6/7/2018] methylPREDNISolone  4 mg Oral Once    Followed by    [START ON  6/7/2018] methylPREDNISolone  4 mg Oral Once    Followed by    [START ON 6/8/2018] methylPREDNISolone  4 mg Oral Once    polyethylene glycol  17 g Oral Daily    sodium chloride 0.9%  3 mL Intravenous Q8H    valACYclovir  1,000 mg Oral TID     PRN Meds:.acetaminophen, dextrose 50%, glucagon (human recombinant), insulin aspart U-100, labetalol, magnesium oxide, magnesium oxide, ondansetron, potassium chloride 10%, potassium chloride 10%, potassium chloride 10%, potassium, sodium phosphates, potassium, sodium phosphates, potassium, sodium phosphates    OBJECTIVE:   Vital Signs (Most Recent):   Temp: 98.6 °F (37 °C) (06/03/18 0700)  Pulse: 62 (06/03/18 1000)  Resp: 20 (06/03/18 1000)  BP: (!) 144/64 (06/03/18 1000)  SpO2: 97 % (06/03/18 1000)    Vital Signs (24h Range):   Temp:  [98 °F (36.7 °C)-98.6 °F (37 °C)] 98.6 °F (37 °C)  Pulse:  [51-74] 62  Resp:  [13-29] 20  SpO2:  [95 %-100 %] 97 %  BP: (113-191)/(55-89) 144/64    ICP/CPP (Last 24h):        I & O (Last 24h):     Intake/Output Summary (Last 24 hours) at 06/03/18 1103  Last data filed at 06/03/18 1000   Gross per 24 hour   Intake             1325 ml   Output                0 ml   Net             1325 ml     Physical Exam:  GA: Alert, comfortable, no acute distress.   HEENT: No scleral icterus or JVD.   Pulmonary: Clear to auscultation A/P/L. No wheezing, crackles, or rhonchi.  Cardiac: RRR S1 & S2 w/o rubs/murmurs/gallops.   Abdominal: Bowel sounds present x 4. No appreciable hepatosplenomegaly.  Skin: No jaundice, rashes, or visible lesions.  Neck: tenderness of the posterior auricular region  Neuro:  --GCS: 15  --Mental Status:  Awake and alert, stuttering speech, follows commands. Fund of knowledge intact  --Pupils 3.5 mm, PERRL.   Left facial nerve palsy LMN type  --LUE strength: 5/5  --RUE strength: 5/5  --LLE strength: 5/5  --RLE strength: 5/5  Subjective decrease in sensation on left LE    Vent Data:        Lines/Drains/Airway:          Nutrition/Tube  Feeds (if NPO state why): po     Labs:  ABG: No results for input(s): PH, PO2, PCO2, HCO3, POCSATURATED, BE in the last 24 hours.  BMP:    Recent Labs  Lab 06/03/18  0516      K 4.3      CO2 21*   BUN 13   CREATININE 0.7   *   MG 2.0   PHOS 2.6*     LFT:   Lab Results   Component Value Date    AST 19 06/03/2018    ALT 21 06/03/2018    ALKPHOS 41 (L) 06/03/2018    BILITOT 0.6 06/03/2018    ALBUMIN 3.7 06/03/2018    PROT 6.4 06/03/2018     CBC:   Lab Results   Component Value Date    WBC 5.70 06/03/2018    HGB 12.4 06/03/2018    HCT 37.3 06/03/2018    MCV 91 06/03/2018     06/03/2018     Microbiology x 7d:   Microbiology Results (last 7 days)     ** No results found for the last 168 hours. **        Imaging:  MRI brain - neg for acute ischemic lesion  I personally reviewed the above image.    ASSESSMENT/PLAN:     Active Hospital Problems    Diagnosis    *Status post administration of tPA (rtPA) in a different facility within the last 24 hours prior to admission to current facility    Thrombotic stroke involving right middle cerebral artery    Essential hypertension    Type 2 diabetes mellitus    Mixed hyperlipidemia      Neuro:   Post tpa for acute onset of focal neurological symptoms  MRI neg  Clinical picture suspicious for Bell's palsy  Trial of medrol dose pack and valtrex    Pulmonary:   Stable saturations on room air    Cardiac:   Hemodynamically stable  On home antihypertensives    Renal:    Making urine    ID:   Afebrile, normal wbc    Hem/Onc:   Stable hh and plt counts    Endocrine:    BS stable  Monitor BS while on steroid therapy    Fluids/Electrolytes/Nutrition/GI:   Po  Encourage oral intake 1.5-2L/day while on valtrex  Lines:  Art NA  CVC NA  ETT NA  Fabian NA  NG NA  PEG NA    Proph:  DVT:scd/heparin 24hrs post thrombolysis  Constipation:  Last output:bowel regimen  PUP:NA  VAP:NA    Full Code  Uninterrupted Critical Care/Counseling Time (not including procedures)::  III    Emory Sepulveda MD  Neurocritical care attending    Emory Sepulveda MD  Neurocritical Care  [facility]

## 2018-06-03 NOTE — HOSPITAL COURSE
Mrs. Sandy Prince was admitted to [facility] s/p tPA for 24-hr close monitoring. Cerebral imaging negative for acute infarction. Etiology of patient's presentation more consistent with Bell's Palsy +/- associated Conversion disorder; treated accordingly with Prednisone and Valtrex. Initiated Plavix for stroke prophylaxis.    Patient discharged with recommendations for home health. Family at bedside amenable to plan.     The patient with improvement in symptoms since admission. Inpatient acute stroke work up completed and patient stable for discharge. Please see all appropriate medication changes, imaging results, and necessary follow-up below.

## 2018-06-04 ENCOUNTER — TELEPHONE (OUTPATIENT)
Dept: NEUROLOGY | Facility: CLINIC | Age: 73
End: 2018-06-04

## 2018-06-04 VITALS
HEIGHT: 67 IN | HEART RATE: 78 BPM | RESPIRATION RATE: 18 BRPM | OXYGEN SATURATION: 95 % | TEMPERATURE: 98 F | WEIGHT: 168 LBS | BODY MASS INDEX: 26.37 KG/M2 | SYSTOLIC BLOOD PRESSURE: 140 MMHG | DIASTOLIC BLOOD PRESSURE: 65 MMHG

## 2018-06-04 PROBLEM — R53.1 LEFT-SIDED WEAKNESS: Status: ACTIVE | Noted: 2018-06-01

## 2018-06-04 PROBLEM — G51.0 BELL'S PALSY: Status: ACTIVE | Noted: 2018-06-04

## 2018-06-04 PROBLEM — Z74.09 IMPAIRED FUNCTIONAL MOBILITY AND ENDURANCE: Status: ACTIVE | Noted: 2018-06-04

## 2018-06-04 LAB
ALBUMIN SERPL BCP-MCNC: 4.1 G/DL
ALP SERPL-CCNC: 48 U/L
ALT SERPL W/O P-5'-P-CCNC: 23 U/L
ANION GAP SERPL CALC-SCNC: 9 MMOL/L
AST SERPL-CCNC: 16 U/L
BASOPHILS # BLD AUTO: 0.02 K/UL
BASOPHILS NFR BLD: 0.2 %
BILIRUB SERPL-MCNC: 0.7 MG/DL
BUN SERPL-MCNC: 17 MG/DL
CALCIUM SERPL-MCNC: 10.5 MG/DL
CHLORIDE SERPL-SCNC: 104 MMOL/L
CO2 SERPL-SCNC: 22 MMOL/L
CREAT SERPL-MCNC: 1 MG/DL
DIFFERENTIAL METHOD: ABNORMAL
EOSINOPHIL # BLD AUTO: 0.1 K/UL
EOSINOPHIL NFR BLD: 0.5 %
ERYTHROCYTE [DISTWIDTH] IN BLOOD BY AUTOMATED COUNT: 11.4 %
EST. GFR  (AFRICAN AMERICAN): >60 ML/MIN/1.73 M^2
EST. GFR  (NON AFRICAN AMERICAN): 55.6 ML/MIN/1.73 M^2
GLUCOSE SERPL-MCNC: 271 MG/DL
HCT VFR BLD AUTO: 41.4 %
HGB BLD-MCNC: 13.9 G/DL
IMM GRANULOCYTES # BLD AUTO: 0.05 K/UL
IMM GRANULOCYTES NFR BLD AUTO: 0.5 %
INR PPP: 1.1
LYMPHOCYTES # BLD AUTO: 1.7 K/UL
LYMPHOCYTES NFR BLD: 15.5 %
MAGNESIUM SERPL-MCNC: 2.1 MG/DL
MCH RBC QN AUTO: 30.5 PG
MCHC RBC AUTO-ENTMCNC: 33.6 G/DL
MCV RBC AUTO: 91 FL
MONOCYTES # BLD AUTO: 0.8 K/UL
MONOCYTES NFR BLD: 7.3 %
NEUTROPHILS # BLD AUTO: 8.1 K/UL
NEUTROPHILS NFR BLD: 76 %
NRBC BLD-RTO: 0 /100 WBC
PHOSPHATE SERPL-MCNC: 2.8 MG/DL
PLATELET # BLD AUTO: 274 K/UL
PMV BLD AUTO: 11.2 FL
POCT GLUCOSE: 227 MG/DL (ref 70–110)
POTASSIUM SERPL-SCNC: 4.9 MMOL/L
PROT SERPL-MCNC: 7.3 G/DL
PROTHROMBIN TIME: 11.2 SEC
RBC # BLD AUTO: 4.56 M/UL
SODIUM SERPL-SCNC: 135 MMOL/L
WBC # BLD AUTO: 10.62 K/UL

## 2018-06-04 PROCEDURE — 99222 1ST HOSP IP/OBS MODERATE 55: CPT | Mod: ,,, | Performed by: NURSE PRACTITIONER

## 2018-06-04 PROCEDURE — 25000003 PHARM REV CODE 250: Performed by: NURSE PRACTITIONER

## 2018-06-04 PROCEDURE — 99233 SBSQ HOSP IP/OBS HIGH 50: CPT | Mod: ,,, | Performed by: PSYCHIATRY & NEUROLOGY

## 2018-06-04 PROCEDURE — G8989 SELF CARE D/C STATUS: HCPCS | Mod: CJ

## 2018-06-04 PROCEDURE — A4216 STERILE WATER/SALINE, 10 ML: HCPCS | Performed by: NURSE PRACTITIONER

## 2018-06-04 PROCEDURE — 85025 COMPLETE CBC W/AUTO DIFF WBC: CPT

## 2018-06-04 PROCEDURE — 25000003 PHARM REV CODE 250: Performed by: PHYSICIAN ASSISTANT

## 2018-06-04 PROCEDURE — 63600175 PHARM REV CODE 636 W HCPCS: Performed by: PSYCHIATRY & NEUROLOGY

## 2018-06-04 PROCEDURE — 97116 GAIT TRAINING THERAPY: CPT

## 2018-06-04 PROCEDURE — G8980 MOBILITY D/C STATUS: HCPCS | Mod: CJ

## 2018-06-04 PROCEDURE — 25000003 PHARM REV CODE 250: Performed by: PSYCHIATRY & NEUROLOGY

## 2018-06-04 PROCEDURE — 80053 COMPREHEN METABOLIC PANEL: CPT

## 2018-06-04 PROCEDURE — 85610 PROTHROMBIN TIME: CPT

## 2018-06-04 PROCEDURE — 92526 ORAL FUNCTION THERAPY: CPT

## 2018-06-04 PROCEDURE — 92523 SPEECH SOUND LANG COMPREHEN: CPT

## 2018-06-04 PROCEDURE — G8988 SELF CARE GOAL STATUS: HCPCS | Mod: CJ

## 2018-06-04 PROCEDURE — 83735 ASSAY OF MAGNESIUM: CPT

## 2018-06-04 PROCEDURE — 97535 SELF CARE MNGMENT TRAINING: CPT

## 2018-06-04 PROCEDURE — 84100 ASSAY OF PHOSPHORUS: CPT

## 2018-06-04 PROCEDURE — 36415 COLL VENOUS BLD VENIPUNCTURE: CPT

## 2018-06-04 RX ORDER — VALACYCLOVIR HYDROCHLORIDE 1 G/1
1000 TABLET, FILM COATED ORAL 3 TIMES DAILY
Qty: 18 TABLET | Refills: 0 | Status: SHIPPED | OUTPATIENT
Start: 2018-06-04 | End: 2022-12-09 | Stop reason: CLARIF

## 2018-06-04 RX ORDER — CLOPIDOGREL BISULFATE 75 MG/1
75 TABLET ORAL DAILY
Status: DISCONTINUED | OUTPATIENT
Start: 2018-06-04 | End: 2018-06-04 | Stop reason: HOSPADM

## 2018-06-04 RX ORDER — PREDNISONE 20 MG/1
20 TABLET ORAL 3 TIMES DAILY
Qty: 18 TABLET | Refills: 0 | Status: SHIPPED | OUTPATIENT
Start: 2018-06-04 | End: 2018-06-10

## 2018-06-04 RX ORDER — INSULIN ASPART 100 [IU]/ML
7 INJECTION, SOLUTION INTRAVENOUS; SUBCUTANEOUS
Status: DISCONTINUED | OUTPATIENT
Start: 2018-06-04 | End: 2018-06-04

## 2018-06-04 RX ORDER — CLOPIDOGREL BISULFATE 75 MG/1
75 TABLET ORAL DAILY
Qty: 30 TABLET | Refills: 11 | Status: SHIPPED | OUTPATIENT
Start: 2018-06-04 | End: 2022-12-09 | Stop reason: CLARIF

## 2018-06-04 RX ADMIN — METHYLPREDNISOLONE 4 MG: 4 TABLET ORAL at 07:06

## 2018-06-04 RX ADMIN — METHYLPREDNISOLONE 4 MG: 4 TABLET ORAL at 08:06

## 2018-06-04 RX ADMIN — LOSARTAN POTASSIUM 50 MG: 50 TABLET, FILM COATED ORAL at 08:06

## 2018-06-04 RX ADMIN — ATORVASTATIN CALCIUM 40 MG: 20 TABLET, FILM COATED ORAL at 08:06

## 2018-06-04 RX ADMIN — Medication 3 ML: at 05:06

## 2018-06-04 RX ADMIN — VALACYCLOVIR HYDROCHLORIDE 1000 MG: 500 TABLET, FILM COATED ORAL at 08:06

## 2018-06-04 RX ADMIN — GABAPENTIN 300 MG: 300 CAPSULE ORAL at 08:06

## 2018-06-04 NOTE — PLAN OF CARE
06/04/18 1322   Final Note   Assessment Type Final Discharge Note   Discharge Disposition Home     Patient is discharged to home today with home health. Sw informed that the patient's preference for HH is [facility] but if they can't take the patient any other HH will be fine with the patient/family. Sw to arrange HH. Rolling walker to be delivered by [employer] to the patient's room. Michael spoke to Joyce whom stated that they are working on the order. Michael informed Mario the bedside Rn that the walker should be delivered to the room in about an hour and to please call Michael or Joyce at [facility] spectra link [phone] if it does not arrive by 2:15pm. Patient's family will provide transportation home today.

## 2018-06-04 NOTE — ASSESSMENT & PLAN NOTE
Pt with upper and lower Left CN VII palsy; Unable to close L eye  Prednisone 60mg Daily + Valtrex 1000mg Daily x 1 week  Pt to follow up with PCP and monitor for improvement, as well as manage BG levels while on CST tx  Pt with eye patch to be worn on the L eye at night

## 2018-06-04 NOTE — ASSESSMENT & PLAN NOTE
Stroke risk factor  SBP <140 long-term  BP somewhat labile on home regimen  Recommend close PCP follow-up

## 2018-06-04 NOTE — PT/OT/SLP EVAL
"Speech Language Pathology Evaluation  Cognitive Communication and Dysphagia Therapy    Patient Name:  Sandy Prince   MRN:  [id number]  Admitting Diagnosis: Left-sided weakness    Recommendations:     Recommendations:                General Recommendations:  Speech/language therapy  Diet recommendations:  Dental Soft, Thin   Aspiration Precautions: 1 bite/sip at a time, Check for pocketing/oral residue, Meds whole 1 at a time, Remain upright 30 minutes post meal and Standard aspiration precautions   General Precautions: Standard, aspiration, dental soft, fall  Communication strategies:  provide increased time to answer    History:     Past Medical History:   Diagnosis Date    DM (diabetes mellitus)     Essential hypertension 6/1/2018    HTN (hypertension)     Mixed hyperlipidemia 6/1/2018    Thrombotic stroke involving right middle cerebral artery 6/1/2018    TIA (transient ischemic attack)     Von Willebrand disease        History reviewed. No pertinent surgical history.      Prior diet: Regular Diet / Thin Liquids.       Subjective   Patient Goal:  "I'm going on a mission trip in a few months, I have to get there"  Daughter present t/o session.     Pain/Comfort:  · Pain Rating 1: 0/10  · Pain Rating Post-Intervention 2: 0/10    Objective:   Cognitive Status:    Arousal/Alertness: WFL  Attention: WFL  Perseveration: None  Orientation: Ox4 indep.   Memory: Repeated 3-5 item number/item sequences with 100% acc indep. Recalled 2/3 unassociated words within 1 min filled delay. Stimulus: "Brush, cup, lamp" Pt answer: "Brush, cup, comb".   Problem Solving: Simple problem solving 100% acc indep. Higher level math/time completed with 75% acc indep.   Safety awareness WFL  Managing finances: Will have assistance from daughter.   Reasoning: WFL     Receptive Language:   Comprehension:   Questions: simple to complex YNQ with 100% acc indep.   Commands: simple to complex YNQ with 100% acc inidep.   Conversation: " "WFL    Pragmatics:    WFL    Expressive Language:  Verbal:    Repetition: WFL  Naming: responsive and confrontation naming with 100% acc indep.   Sentence completion: 100% acc  Conversational speech: [facility]        Motor Speech:  Pt presents with most notable deficits here.  Dysarthria.  Speech fluency impairment.   Spontaneous and volitional speech productions c/b sound prolongations and sound repetitions. Longest prolongation noted to be 3s. Highest number of sound repetitions noted for 3.   Intelligibility overall ~80-90% intelligible during decontextualized conversational discourse.    Voice:   WFL    Visual-Spatial:  No deficits noted.     Reading:   [facility]     Written Expression:   Did not asses.     Dysphagia Therapy:  Pt tolerated thin liquid trials with no overt s/s airway compromise for ~5oz via straw. Regular solids continue to reveal mild lingual residue on the L. Pt reported "I know after a bite I need to sweep my tongue to that side."     ST provided skilled education re: results of SLC E, aspiration precautions, and diet recommendations to Pt and family member. Both verbalized understanding.     Assessment:   Sandy Prince is a 72 y.o. female with an SLP diagnosis of Speech fluency impairment, Dysphagia, Dysarthria and Cognitive-Linguistic Impairment.  Continue dental soft diet / thin liquids with standard aspiration precautions above.     Goals:    SLP Goals        Problem: SLP Goal    Goal Priority Disciplines Outcome   SLP Goal     SLP Ongoing (interventions implemented as appropriate)   Description:  Speech Therapy Short Term Goals  Goal expected to be met by 6/9  1. Pt will tolerate a dental soft diet and thin liquids with no overt s/s of aspiration. MET  2. Pt will participate in a speech, language, and cognitive evaluation with possible updated goals to follow pending results. MET  Added 6/4  3. Pt will recall 3/3 unassociated words within 1 min filled delay.   4. Complete OME's x10 each to facilitate " increased lingual/labial/facial strength and ROM.   3. Given education on strategies, Produce 3-4 word phrases with >70% fluency.   4. Complete math/time problem solving tasks with 90% acc indep.                          Plan:   · Patient to be seen:  4 x/week   · Plan of Care expires:  06/09/18  · Plan of Care reviewed with:  patient, daughter   · SLP Follow-Up:  Yes       Discharge recommendations:  Discharge Facility/Level Of Care Needs: outpatient speech therapy       Time Tracking:   SLP Treatment Date:   06/04/18  Speech Start Time:  1140  Speech Stop Time:  1205     Speech Total Time (min):  25 min    Billable Minutes: Eval 15  and Treatment Swallowing Dysfunction 10    Ting Govea M.S., CCC-SLP  Speech Language Pathologist  [phone]  06/04/2018

## 2018-06-04 NOTE — HOSPITAL COURSE
6/2/18: Evaluated by therapy.  Sit to stand CGA-Eddie & RW.  Ambulated  8 steps CGA-Eddie.  UBD/toileting Eddie. Grooming SBA.  6/4/18: Participated with therapy.  Bed mobility (I) .  Sit to stand and transfers SV.  UBD, grooming, toileting and LBD SV.

## 2018-06-04 NOTE — PLAN OF CARE
[facility]    HOME HEALTH ORDERS  FACE TO FACE ENCOUNTER    Patient Name: Sandy Prince  YOB: 1945    PCP: Cass Bae NP   PCP Address: [address] [facility] [place]*  PCP Phone Number: [phone]  PCP Fax: [phone]    Encounter Date: 06/04/2018    Admit to Home Health    Diagnoses:  Active Hospital Problems    Diagnosis  POA    *Left-sided weakness [R53.1]  Yes    Bell's palsy [G51.0]  Unknown    Essential hypertension [I10]  Yes    Type 2 diabetes mellitus [E11.9]  Yes    Mixed hyperlipidemia [E78.2]  Yes    Status post administration of tPA (rtPA) in a different facility within the last 24 hours prior to admission to current facility [Z92.82]  Not Applicable      Resolved Hospital Problems    Diagnosis Date Resolved POA   No resolved problems to display.       No future appointments.  Follow-up Information     Cass Bae NP On 6/14/2018.    Specialty:  Family Medicine  Why:  appt at 8:15 am  Contact information:  Irena [address]  [facility] [place] [address]  [phone]             [facility].    Specialty:  Neurology  Why:  The office will call you to schedule an appt in 4-6 weeks. If the office does not call you by 6/11/18 please call to schedule an appt.  Contact information:  [address]  [facility] [address] [phone]  Additional information:  7th Floor                   I have seen and examined this patient face to face today. My clinical findings that support the need for the home health skilled services and home bound status are the following:  Weakness/numbness causing balance and gait disturbance due to Weakness/Debility making it taxing to leave home.  Requiring assistive device to leave home due to unsteady gait caused by  Weakness/Debility.    Allergies:  Review of patient's allergies indicates:   Allergen Reactions    Aspirin        Diet: soft  diet and fluid consistency thin    Activities: activity as tolerated    Nursing:   SN to complete comprehensive assessment including routine vital signs. Instruct on disease process and s/s of complications to report to MD. Review/verify medication list sent home with the patient at time of discharge  and instruct patient/caregiver as needed. Frequency may be adjusted depending on start of care date.    Notify MD if SBP > 160 or < 90; DBP > 90 or < 50; HR > 120 or < 50; Temp > 101; Other:   New neurologic symptoms      CONSULTS:    Physical Therapy to evaluate and treat. Evaluate for home safety and equipment needs; Establish/upgrade home exercise program. Perform / instruct on therapeutic exercises, gait training, transfer training, and Range of Motion.  Occupational Therapy to evaluate and treat. Evaluate home environment for safety and equipment needs. Perform/Instruct on transfers, ADL training, ROM, and therapeutic exercises.  Speech Therapy  to evaluate and treat for  Language.   to evaluate for community resources/long-range planning.    MISCELLANEOUS CARE:  Diabetic Care:   SN to perform and educate Diabetic management with blood glucose monitoring:, Fingerstick blood sugar AC and HS and Report CBG < 60 or > 350 to physician.    WOUND CARE ORDERS  n/a      Medications: Review discharge medications with patient and family and provide education.      Current Discharge Medication List      START taking these medications    Details   clopidogrel (PLAVIX) 75 mg tablet Take 1 tablet (75 mg total) by mouth once daily.  Qty: 30 tablet, Refills: 11      predniSONE (DELTASONE) 20 MG tablet Take 1 tablet (20 mg total) by mouth 3 (three) times daily.  Qty: 18 tablet, Refills: 0      valACYclovir (VALTREX) 1000 MG tablet Take 1 tablet (1,000 mg total) by mouth 3 (three) times daily.  Qty: 18 tablet, Refills: 0         CONTINUE these medications which have NOT CHANGED    Details   gabapentin (NEURONTIN) 300  MG capsule Take 300 mg by mouth 2 (two) times daily.      insulin glargine (LANTUS) 100 unit/mL injection Inject 40 Units into the skin 2 (two) times daily.      losartan (COZAAR) 50 MG tablet Take 50 mg by mouth once daily.      metFORMIN (GLUCOPHAGE) 500 MG tablet Take 500 mg by mouth 2 (two) times daily with meals.             I certify that this patient is confined to her home and needs physical therapy, speech therapy and occupational therapy.

## 2018-06-04 NOTE — HPI
Sandy Prince is a 72-year-old female with PMHx of DM, CAD, HTN, TIA and Von Willebrand disease .  Patient presented to [facility] with slurred speech, L sided weakness, and numbness.  CTH revealed no acute pathology.  A telemedicine consult was placed and completed.  tPA was recommended and administered. [place] [facility] [place] for further evaluation and management.  Upon admission, CTA negative for LVO.  MRI brain unremarkable.  Hospital course complicated by continued L sided HA/pressure, L sided weakness and dysarthria (The patient's continued symptoms in the setting of negative brain imaging could be related to Bell's Palsy, conversion disorder in which the patient was started on steroids).     Functional History: Patient lives in [place], [place] with daughter, son-in-law, and two grand kids (26 and 29 y/o) in two story home with no steps to enter.  Prior to admission, (I) with ADLs and mobility.  DME: none.

## 2018-06-04 NOTE — PT/OT/SLP PROGRESS
"Physical Therapy Treatment  Discharge Summary    Patient Name:  Sandy Prince   MRN:  [id number]    Recommendations:     Discharge Recommendations:  home health PT   Discharge Equipment Recommendations: walker, rolling   Barriers to discharge: None    Plan:     During this hospitalization, patient to be seen 5 x/week to address the above listed problems via gait training, therapeutic activities, therapeutic exercises, neuromuscular re-education  · Plan of Care Expires:  07/02/18   Plan of Care Reviewed with: patient, daughter    Subjective     Communicated with Rn prior to session.    Patient comments/goals: "I am feeling stronger today.  I have family and friends who can help me for a couple of days. It is hard to ask for help. I am used to helping others."  Pain/Comfort:  · Pain Rating 1: 0/10  · Pain Rating Post-Intervention 1: 0/10    Objective:     Patient found with: telemetry, bed alarm     General Precautions: Standard, aspiration, fall   Patient was found supine in bed with stroke team at bedside.  She agreed to therapy.  Gait training was performed with and without an assistive device to determine appropriateness of change in d/c plan.  Patient participated well.  See below for details.     Functional Mobility:  Bed Mobility:   · Rolling Right: NT  · Rolling Left: NT  · Supine to Sit: SBA  · Sit to Supine: NT     Transfers:   · Sit <> Stand Transfer:  SBA, performed 5x with and without RW   · Bed <> Chair Transfer: min assistance without RW    Gait:  Gait x 30 feet with RW, min assistance d/t L knee buckling.   Gait x 30 feet HHA with no assistive device, moderate assistance d/t L knee buckling    PT educated patient on proper use of RW and weight bearing through bilateral UEs to improve stability during gait.   Gait x 30 feet with RW, CGA with 1 episode of minor knee buckling.  Pt able to support herself on her arms.   Gait x 30 feet with Rw, CGA with dtr assisting, 1 very minor episode of L knee buckling " with no difficulty using RW    Therapeutic Activities and Exercises:   Education :  PT discussed mobility during therapy, assistance requirements and use of RW with patient and dtr.  They expressed interest in d/c with HH therapy and family assistance.  PT provided stroke education.  Therapist answered questions to patient/familys satisfaction within scope of practice.  White board updated to reflect current level of assistance. Patient and family aware of patient's deficits and therapy progression. Patient was educated to transfer with assistance. White board updated in patient's room, RN notified of session.    At the end of the session, the patient was left sitting up in chair with all lines intact, call button in reach and dtr present.      AM-PAC 6 CLICK MOBILITY  Turning over in bed (including adjusting bedclothes, sheets and blankets)?: 4  Sitting down on and standing up from a chair with arms (e.g., wheelchair, bedside commode, etc.): 4  Moving from lying on back to sitting on the side of the bed?: 4  Moving to and from a bed to a chair (including a wheelchair)?: 3  Need to walk in hospital room?: 3  Climbing 3-5 steps with a railing?: 3  Total Score: 21     GOALS:    Physical Therapy Goals        Problem: Physical Therapy Goal    Goal Priority Disciplines Outcome Goal Variances Interventions   Physical Therapy Goal     PT/OT, PT Ongoing (interventions implemented as appropriate)     Description:  Goals to be met by: 2018     Patient will increase functional independence with mobility by performin. Supine to sit with Set-up [place]  2. Sit to supine with Set-up [place]  3. Sit to stand transfer with Supervision  4. Bed to chair transfer with Supervision with/without AD  5. Gait  x 200 feet with Supervision with/without AD.   6. Lower extremity exercise program x15 reps per handout, with supervision                      Assessment:     Sandy Prince is a 72 y.o. female admitted with a  medical diagnosis of Left-sided weakness.  She has progressed well from the initial evaluation and with every subsequent gait trial.  She is safe to ambulate with RW and family assistance. Recommend change in d/c plan from rehab to HH with family assistance. Family training was performed with the patient and her dtr.  Both expressed confidence in ability to safely perform mobility.     She presents with the following impairments/functional limitations:  weakness, impaired functional mobilty, impaired endurance, gait instability, decreased upper extremity function, decreased lower extremity function, impaired self care skills, impaired balance.    Rehab Prognosis:  Good; patient would benefit from acute skilled PT services to address these deficits and reach maximum level of function.      Recent Surgery: * No surgery found *        Time Tracking:     PT Received On: 06/04/18  PT Start Time: 1050     PT Stop Time: 1130  PT Total Time (min): 40 min     Billable Minutes: Gait Training 40    Treatment Type: Treatment  PT/PTA: PT         Kathe Duran, PT  6/4/2018  [phone] (pager)

## 2018-06-04 NOTE — CONSULTS
Inpatient consult to Physical Medicine Rehab  Consult performed by: GISELLE MCDONNELL  Consult ordered by: UZMA VELIZ  Reason for consult: assess rehab needs        Reviewed patient history and current admission.  Rehab team following.  Full consult to follow.    ELIZABETH Kaiser, FNP-C  Physical Medicine & Rehabilitation   06/04/2018  Spectralink: [id number]

## 2018-06-04 NOTE — CONSULTS
[facility]  Physical Medicine & Rehab  Consult Note    Patient Name: Sandy Prince  MRN: [id number]  Admission Date: 6/1/2018  Hospital Length of Stay: 3 days  Attending Physician: Ed Teixeira MD   Inpatient consult to Physical Medicine & Rehabilitation  Consult performed by: Cassie Narvaez NP  Consult requested by:  Ed Teixeira MD    Reason for Consult:  assess rehabilitation needs  Consults  Subjective:     Principal Problem: Left-sided weakness    HPI: Sandy Prince is a 72-year-old female with PMHx of DM, CAD, HTN, TIA and Von Willebrand disease .  Patient presented to [facility] with slurred speech, L sided weakness, and numbness.  CTH revealed no acute pathology.  A telemedicine consult was placed and completed.  tPA was recommended and administered. Transferred to [facility] on 6/1 for further evaluation and management.  Upon admission, CTA negative for LVO.  MRI brain unremarkable.  Hospital course complicated by continued L sided HA/pressure, L sided weakness and dysarthria (The patient's continued symptoms in the setting of negative brain imaging could be related to Bell's Palsy, conversion disorder in which the patient was started on steroids).     Functional History: Patient lives in [place], [place] with daughter, son-in-law, and two grand kids (26 and 29 y/o) in two story home with no steps to enter.  Prior to admission, (I) with ADLs and mobility.  DME: none.    Hospital Course: 6/2/18: Evaluated by therapy.  Sit to stand CGA-Eddie & RW.  Ambulated  8 steps CGA-Eddie.  UBD/toileting Eddie. Grooming SBA.  6/4/18: Participated with therapy.  Bed mobility (I) .  Sit to stand and transfers SV.  UBD, grooming, toileting and LBD SV.      Past Medical History:   Diagnosis Date    DM (diabetes mellitus)     Essential hypertension 6/1/2018    HTN (hypertension)     Mixed hyperlipidemia 6/1/2018    Thrombotic stroke involving right middle cerebral artery  6/1/2018    TIA (transient ischemic attack)     Von Willebrand disease      History reviewed. No pertinent surgical history.  Review of patient's allergies indicates:   Allergen Reactions    Aspirin        Scheduled Medications:    atorvastatin  40 mg Oral Daily    clopidogrel  75 mg Oral Daily    gabapentin  300 mg Oral TID    losartan  50 mg Oral Daily    methylPREDNISolone  4 mg Oral Once    Followed by    methylPREDNISolone  4 mg Oral Once    Followed by    methylPREDNISolone  4 mg Oral Once    Followed by    [START ON 6/5/2018] methylPREDNISolone  4 mg Oral Once    Followed by    [START ON 6/5/2018] methylPREDNISolone  4 mg Oral Once    Followed by    [START ON 6/5/2018] methylPREDNISolone  4 mg Oral Once    Followed by    [START ON 6/5/2018] methylPREDNISolone  4 mg Oral Once    Followed by    [START ON 6/6/2018] methylPREDNISolone  4 mg Oral Once    Followed by    [START ON 6/6/2018] methylPREDNISolone  4 mg Oral Once    Followed by    [START ON 6/6/2018] methylPREDNISolone  4 mg Oral Once    Followed by    [START ON 6/7/2018] methylPREDNISolone  4 mg Oral Once    Followed by    [START ON 6/7/2018] methylPREDNISolone  4 mg Oral Once    polyethylene glycol  17 g Oral Daily    sodium chloride 0.9%  3 mL Intravenous Q8H    valACYclovir  1,000 mg Oral TID       PRN Medications: acetaminophen, dextrose 50%, glucagon (human recombinant), insulin aspart U-100, labetalol, ondansetron    Family History     Unknown per patient.         Social History Main Topics    Smoking status: Former Smoker     Packs/day: 0.25     Types: Cigarettes     Quit date: 1/2/1978    Smokeless tobacco: Never Used    Alcohol use No    Drug use: No    Sexual activity: No     Review of Systems   Constitutional: Negative for chills, fatigue and fever.   HENT: Negative for trouble swallowing and voice change.    Eyes: Negative for photophobia and visual disturbance.   Respiratory: Negative for cough, shortness of  breath and wheezing.    Cardiovascular: Negative for chest pain and palpitations.   Gastrointestinal: Negative for abdominal distention, nausea and vomiting.   Genitourinary: Negative for difficulty urinating and flank pain.   Musculoskeletal: Positive for gait problem. Negative for arthralgias.   Skin: Negative for color change and rash.   Neurological: Positive for speech difficulty, weakness and headaches. Negative for numbness.   Psychiatric/Behavioral: Negative for agitation and confusion.     Objective:     Vital Signs (Most Recent):  Temp: 98.4 °F (36.9 °C) (18 1131)  Pulse: 78 (18 1131)  Resp: 18 (18 1131)  BP: (!) 140/65 (18 1131)  SpO2: 95 % (18)    Vital Signs (24h Range):  Temp:  [96.1 °F (35.6 °C)-98.7 °F (37.1 °C)] 98.4 °F (36.9 °C)  Pulse:  [59-79] 78  Resp:  [16-18] 18  SpO2:  [88 %-98 %] 95 %  BP: (104-184)/(50-84) 140/65     Body mass index is 26.31 kg/m².    Physical Exam   Constitutional: She appears well-developed and well-nourished.   Daughter at bedside   HENT:   Head: Normocephalic and atraumatic.   Eyes: EOM are normal. Pupils are equal, round, and reactive to light.   Neck: Normal range of motion.   Cardiovascular: Normal rate and regular rhythm.    Pulmonary/Chest: Effort normal. No respiratory distress.   Abdominal: Soft. There is no tenderness.   Musculoskeletal: Normal range of motion. She exhibits no deformity.   Neurological:   -  Mental Status:  AAOx3.  Follows commands.  Answers correct age and .    -  Speech and language:  - aphasia + dysarthria.    -  Motor:  Mild L sided weakness  -  Tone:  normal  -  Sensory:  Decreased to L to light touch and pin prick.  Psychiatric: She has a normal mood and affect. Her behavior is normal.   Vitals reviewed.      Diagnostic Results:   Labs: Reviewed  ECG: Reviewed  X-Ray: Reviewed  CT: Reviewed  MRI: Reviewed    Assessment/Plan:     * Left-sided weakness    Recommendations  -  PT and OT evaluate and  treat  -  Monitor for shoulder pain and subluxation  · Arm trough or lapboard use while sitting  · Stretching shoulder depressors/internal rotators  · Avoid pulling on affected arm  -  Monitor for spasticity  · PT/OT  · Initiate stretching program  · Use splints/casting/bracing to help preserve ROM  -  Monitor for skin breakdown and pressure ulcers  · Early mobility   · Repositioning/weight shifting every 20-30 minutes when sitting  · Turn patient every 2 hours  · Proper mattress/overlay and chair cushioning   · Pressure relief/heel protector boots          Impaired functional mobility and endurance    -significantly improved with OT and now rec outpatient OT    See hospital course for functional, cognitive/speech/language, and nutrition/swallow status.      Recommendations  -  Encourage mobility, OOB in chair at least 3 hours per day, and early ambulation as appropriate  -  PT/OT evaluate and treat  -  Pain management  -  Monitor for and prevent skin breakdown and pressure ulcers  · Early mobility, repositioning/weight shifting every 20-30 minutes when sitting, turn patient every 2 hours, proper mattress/overlay and chair cushioning, pressure relief/heel protector boots  -  DVT prophylaxis:  [facility]  -  Reviewed discharge options (IP rehab, SNF, HH therapy, and OP therapy)          Bell's palsy    -receiving steroids         Status post administration of tPA (rtPA) in a different facility within the last 24 hours prior to admission to current facility    -administered 2/2 stroke like symptoms         Mixed hyperlipidemia    -stroke risk factor        Type 2 diabetes mellitus    -stroke risk factor        Essential hypertension    -stroke risk factor        Patient is very high level with OT. Would like to see up to date PT note.  SLP cognition eval pending. Will follow progress and discuss with rehab team for rehab recommendation.      Thank you for your consult.     Cassie Narvaez NP  Department of Physical Medicine  & Rehab  [facility]

## 2018-06-04 NOTE — SUBJECTIVE & OBJECTIVE
Neurologic Chief Complaint:  Left sided weakness, dysarthria    Subjective:     Interval History: Patient is seen for follow-up neurological assessment and treatment recommendations: NAEON.  BP somewhat labile on home regimen. BG elevated likely 2/2 CST therapy. Initiated Plavix for stroke prophylaxis. Pt d/c'd home.    HPI, Past Medical, Family, and Social History remains the same as documented in the initial encounter.     Review of Systems   Constitutional: Negative for chills and fever.   Eyes: Negative for redness and visual disturbance.   Neurological: Positive for facial asymmetry, speech difficulty and numbness. Negative for tremors and weakness.   Psychiatric/Behavioral: Negative for agitation and confusion.     Scheduled Meds:   atorvastatin  40 mg Oral Daily    clopidogrel  75 mg Oral Daily    gabapentin  300 mg Oral TID    losartan  50 mg Oral Daily    methylPREDNISolone  4 mg Oral Once    Followed by    methylPREDNISolone  4 mg Oral Once    Followed by    methylPREDNISolone  4 mg Oral Once    Followed by    [START ON 6/5/2018] methylPREDNISolone  4 mg Oral Once    Followed by    [START ON 6/5/2018] methylPREDNISolone  4 mg Oral Once    Followed by    [START ON 6/5/2018] methylPREDNISolone  4 mg Oral Once    Followed by    [START ON 6/5/2018] methylPREDNISolone  4 mg Oral Once    Followed by    [START ON 6/6/2018] methylPREDNISolone  4 mg Oral Once    Followed by    [START ON 6/6/2018] methylPREDNISolone  4 mg Oral Once    Followed by    [START ON 6/6/2018] methylPREDNISolone  4 mg Oral Once    Followed by    [START ON 6/7/2018] methylPREDNISolone  4 mg Oral Once    Followed by    [START ON 6/7/2018] methylPREDNISolone  4 mg Oral Once    polyethylene glycol  17 g Oral Daily    sodium chloride 0.9%  3 mL Intravenous Q8H    valACYclovir  1,000 mg Oral TID     Continuous Infusions:  PRN Meds:acetaminophen, dextrose 50%, glucagon (human recombinant), insulin aspart U-100, labetalol,  ondansetron    Objective:     Vital Signs (Most Recent):  Temp: 98.4 °F (36.9 °C) (06/04/18 1131)  Pulse: 78 (06/04/18 1131)  Resp: 18 (06/04/18 1131)  BP: (!) 140/65 (06/04/18 1131)  SpO2: 95 % (06/04/18 1131)  BP Location: Right arm    Vital Signs Range (Last 24H):  Temp:  [96.1 °F (35.6 °C)-98.7 °F (37.1 °C)]   Pulse:  [60-79]   Resp:  [17-18]   BP: (104-172)/(50-80)   SpO2:  [88 %-96 %]   BP Location: Right arm    Physical Exam   Constitutional: She is oriented to person, place, and time. She appears well-developed and well-nourished. No distress.   HENT:   Head: Normocephalic and atraumatic.   Eyes: Conjunctivae and EOM are normal.   Cardiovascular: Normal rate.    Pulmonary/Chest: Effort normal. No respiratory distress.   Musculoskeletal: Normal range of motion. She exhibits no edema or deformity.   Neurological: She is alert and oriented to person, place, and time. A cranial nerve deficit and sensory deficit is present.   Skin: Skin is warm and dry.   Psychiatric: She has a normal mood and affect. Her behavior is normal.   Nursing note and vitals reviewed.      Neurological Exam:   LOC: alert  Attention Span: Good   Language: No aphasia  Articulation: stuttering/stammering speech pattern  Orientation: Person, Place, Time   EOM (CN III, IV, VI): Full/intact  Facial Movement (CN VII): Upper & lower facial weakness on the Left  Motor: Arm left  Normal 5/5  Leg left  Paresis: 4/5  Arm right  Normal 5/5  Leg right Normal 5/5  Cebellar: No evidence of appendicular or axial ataxia  Sensation: Ivan-anesthesia left     Laboratory:  CMP:     Recent Labs  Lab 06/04/18 0431   CALCIUM 10.5   ALBUMIN 4.1   PROT 7.3   *   K 4.9   CO2 22*      BUN 17   CREATININE 1.0   ALKPHOS 48*   ALT 23   AST 16   BILITOT 0.7     CBC:     Recent Labs  Lab 06/04/18 0431   WBC 10.62   RBC 4.56   HGB 13.9   HCT 41.4      MCV 91   MCH 30.5   MCHC 33.6     Lipid Panel:     Recent Labs  Lab 06/01/18 1912   CHOL 103*    LDLCALC 28.6*   HDL 48   TRIG 132     Coagulation:   Recent Labs  Lab 06/01/18 1912 06/04/18  0431   INR 1.1  < > 1.1   APTT 24.4  --   --    < > = values in this interval not displayed.  Hgb A1C:     Recent Labs  Lab 06/01/18 1912   HGBA1C 7.9*     TSH:     Recent Labs  Lab 06/01/18 1912   TSH 1.819       Diagnostic Results     Brain Imaging   MRI Brain 6/02/18  Unremarkable motion limited noncontrast MRI brain specifically without evidence for acute infarction or hydrocephalus.    Vessel Imaging   CTA MP 6/01/18 Normal multiphase CT angiogram of the head and neck.  No evidence of large vessel occlusion in the intracranial circulation or hemodynamically significant stenosis in the neck vessels.  No acute intracranial abnormality.  Please consider MRI of the brain for follow-up.    Cardiac Imaging   2D Echo 6/02/18   CONCLUSIONS     1 - Normal left ventricular systolic function (EF 60-65%).     2 - No wall motion abnormalities.     3 - Normal left ventricular diastolic function.     4 - Normal right ventricular systolic function .     5 - The estimated PA systolic pressure is 24 mmHg.     6 - Mild tricuspid regurgitation.

## 2018-06-04 NOTE — PLAN OF CARE
SW confirmed with [facility] that pt will be seen tomorrow by HH nurse.     No additional sw needs.    Jocelyn Tracey, ABNERW [phone]

## 2018-06-04 NOTE — PLAN OF CARE
Problem: Physical Therapy Goal  Goal: Physical Therapy Goal  Goals to be met by: 2018     Patient will increase functional independence with mobility by performin. Supine to sit with Set-up [place]  2. Sit to supine with Set-up [place]  3. Sit to stand transfer with Supervision  4. Bed to chair transfer with Supervision with/without AD  5. Gait  x 200 feet with Supervision with/without AD.   6. Lower extremity exercise program x15 reps per handout, with supervision     Outcome: Ongoing (interventions implemented as appropriate)  Patient participated well in therapy.  POC and goals remain appropriate.  Please refer to progress note for functional mobility.     Patient performed gait training with RW, less episodes of L knee buckling.  RW effective at preventing LOB and falls during these episodes.  Family training was performed with patient and daughter regarding gait deviations, fall risk, and use of RW to improve safety with gait.  They expressed understanding and confidence in use of RW.  They are interested in d/c home with RW and follow up with HH therapy.  Stroke team and Cm advised of change in d/c rec from Rehab to HH with family assistance.  DME needs: RW.     Kathe Duran, PT  2018  [phone] (pager)

## 2018-06-04 NOTE — TELEPHONE ENCOUNTER
----- Message from Ade Troncoso RN sent at 6/4/2018 11:39 AM CDT -----  Contact: Ade Dickens  Please schedule a hospital follow up appt in 4-6 weeks. The patient was seen by Dr. Teixeira while in patient. Please call the patient with date and time of appt.

## 2018-06-04 NOTE — PLAN OF CARE
PCP:Cass Bae NP    Extended Emergency Contact Information  Primary Emergency Contact: Annabel Chanel   [facility]  Home Phone: [phone]  Mobile Phone: [phone]  Relation: Daughter  Preferred language: English    [id number] [place] [place] [facility] [address]  [address] [facility]  [place] [address]  Phone: [phone] Fax: [phone]    Payor: MEDICARE / Plan: MEDICARE PART A & B / Product Type: Government /     Patient was indpendnet living with her adult daughter and adult grandchild prior to hospitalization. Awaiting PT/OT/SLP recommendations due to the patient progressing. Cm gave the patient and her daughter a rehabs list incase therapy is not recommending home. Cm will continue to follow.     06/04/18 1045   Discharge Assessment   Assessment Type Discharge Planning Assessment   Confirmed/corrected address and phone number on facesheet? Yes   Assessment information obtained from? Patient;Caregiver   Expected Length of Stay (days) 1   Communicated expected length of stay with patient/caregiver yes   Prior to hospitilization cognitive status: Alert/Oriented   Prior to hospitalization functional status: Independent   Current cognitive status: Alert/Oriented   Current Functional Status: Needs Assistance   Facility Arrived From: [facility]   Lives With child(kiesha), adult;grandchild(kiesha)   Able to Return to Prior Arrangements unable to determine at this time (comments)   Is patient able to care for self after discharge? Unable to determine at this time (comments)   Who are your caregiver(s) and their phone number(s)? Edwardsduran Chanel (daughter) [phone]   Patient's perception of discharge disposition home or selfcare;home health   Readmission Within The Last 30 Days current reason for admission unrelated to previous admission   If yes, most recent facility name: [facility] ([facility]   Patient currently being followed by  outpatient case management? No   Patient currently receives any other outside agency services? No   Equipment Currently Used at Home none   Do you have any problems affording any of your prescribed medications? Yes   If yes, what medications? all, patient is on a fixed income   Is the patient taking medications as prescribed? yes   Does the patient have transportation home? Yes   Transportation Available car;family or friend will provide   Does the patient receive services at the [facility]? No   Discharge Plan A Home with family;Home Health   Discharge Plan B Home with family   Patient/Family In Agreement With Plan yes   Readmission Questionnaire   At the time of your discharge, did someone talk to you about what your health problems were? Yes   At the time of discharge, did someone talk to you about what to watch out for regarding worsening of your health problem? Yes   At the time of discharge, did someone talk to you about what to do if you experienced worsening of your health problem? Yes   At the time of discharge, did someone talk to you about which medication to take when you left the hospital and which ones to stop taking? Yes   At the time of discharge, did someone talk to you about when and where to follow up with a doctor after you left the hospital? Yes   What do you believe caused you to be sick enough to be re-admitted? I had stroke symptoms this time. My last hosptialization was for kidney stones.   How often do you need to have someone help you when you read instructions, pamphlets, or other written material from your doctor or pharmacy? Never   Do you have problems taking your medications as prescribed? No   Do you have any problems affording any of  your prescribed medications? Yes   Do you have problems obtaining/receiving your medications? No   Does the patient have transportation to healthcare appointments? Yes   Living Arrangements house   Does the patient have family/friends to help with  healtcare needs after discharge? yes   Does your caregiver provide all the help you need? Yes   Are you currently feeling confused? No   Are you currently having problems thinking? No   Are you currently having memory problems? No

## 2018-06-04 NOTE — ASSESSMENT & PLAN NOTE
Stroke risk factor   Hgb A1C 7.9  Home meds include lantus and metformin; Continue at d/c  POCT and SSI while admitted, titrate prn  Close PCP follow-up; BG expected to be elevated in setting of CST tx

## 2018-06-04 NOTE — PLAN OF CARE
Problem: SLP Goal  Goal: SLP Goal  Speech Therapy Short Term Goals  Goal expected to be met by 6/9  1. Pt will tolerate a dental soft diet and thin liquids with no overt s/s of aspiration. MET  2. Pt will participate in a speech, language, and cognitive evaluation with possible updated goals to follow pending results. MET  Added 6/4  3. Pt will recall 3/3 unassociated words within 1 min filled delay.   4. Complete OME's x10 each to facilitate increased lingual/labial/facial strength and ROM.   3. Given education on strategies, Produce 3-4 word phrases with >70% fluency.   4. Complete math/time problem solving tasks with 90% acc indep.          Full speech/language/cognitive eval completed with updated POC.     Ting Govea M.S., [facility]-SLP  Speech Language Pathologist  [phone]  06/04/2018

## 2018-06-04 NOTE — PLAN OF CARE
Problem: Occupational Therapy Goal  Goal: Occupational Therapy Goal  Goals to be met by: 6/16    Patient will increase functional independence with ADLs by performing:    Feeding with [place].  UE Dressing with Stand-by Assistance (including snap and tie completion).  LE Dressing with Stand-by Assistance.  Grooming while standing at sink with Stand-by Assistance.  Toileting from toilet with Contact Guard Assistance for hygiene and clothing management.   Supine to sit with Supervision.  Toilet transfer to toilet with Stand-by Assistance.     Goals revised 2 progress noted.  GITA Green  6/4/2018

## 2018-06-04 NOTE — NURSING
Pt was AO x 4, droop to L side with tingling/numbness to extremities, VSS, ambulating to bathroom with assistance x 1, fall precautions in place including bed alarm, tele reading SB/SR. Will continue to monitor.

## 2018-06-04 NOTE — ASSESSMENT & PLAN NOTE
Recommendations  -  PT and OT evaluate and treat  -  Monitor for shoulder pain and subluxation  · Arm trough or lapboard use while sitting  · Stretching shoulder depressors/internal rotators  · Avoid pulling on affected arm  -  Monitor for spasticity  · PT/OT  · Initiate stretching program  · Use splints/casting/bracing to help preserve ROM  -  Monitor for skin breakdown and pressure ulcers  · Early mobility   · Repositioning/weight shifting every 20-30 minutes when sitting  · Turn patient every 2 hours  · Proper mattress/overlay and chair cushioning   · Pressure relief/heel protector boots

## 2018-06-04 NOTE — PROGRESS NOTES
[facility]  Vascular Neurology  Comprehensive Stroke Center  Progress Note    Assessment/Plan:     * Left-sided weakness    Transfer from [facility] for CTA MP for possible intervention. Dr. Rapp completed a telestroke on the patient and TPA was given at 1647. Neurological symptoms consisted of LSW and numbness with patient complaining weakness in her face and tongue. Patient was transferred for CTA MP which did not reveal a LVO therefore is not an IR candidate. On exam patient has LLE drift and c/o of blurry vision in her left eye with a HA in that area. No hemianopsia appreciated on exam. Patient is having right facial twitching mainly with her right eye. She states her speech is slurred and she is also stuttering. Not aphasic. Admit to [facility] s/p tPA infusion and for close neurological monitoring.     Patient continues to have left sided headache/pressure, LSW, and speech difficulty. MRI Brain negative for acute findings. The patient's continued symptoms in the setting of negative brain imaging could be related to Bell's Palsy, conversion disorder. The patient was started on steroids in [facility].      Prednisone and Valtrex ordered, continuing home BP and DM regimens at d/c. Initiated Plavix.   Pt d/c'd home with plans for home health and close PCP follow-up to further manage BP and BG levels (in setting of CST tx.) Pt will follow up with Vascular Neurology one time only in 4-6 weeks.      Antithrombotics for secondary stroke prevention:  Plavix 75 mg Daily  Statins for secondary stroke prevention and hyperlipidemia, if present:   Statins: None: Reason: LDL 28.6, No acute cerebral infarct  Aggressive risk factor modification: HTN, DM, HLD, CAD  Rehab efforts: PT/OT/SLP to evaluate and treat   PT/OT recommend Home with   SLP recommends dental soft, thin liquids  Diagnostics ordered/pending: None   VTE prophylaxis: Heparin 5000 units SQ every 8 hours  SCDs  BP parameters: SBP < 140 long-term         Bell's palsy    Pt with upper and lower Left CN VII palsy; Unable to close L eye  Prednisone 60mg Daily + Valtrex 1000mg Daily x 1 week  Pt to follow up with PCP and monitor for improvement, as well as manage BG levels while on CST tx  Pt with eye patch to be worn on the L eye at night        Status post administration of tPA (rtPA) in a different facility within the last 24 hours prior to admission to current facility    Admitted to [facility] s/p TPA administration -- Completed        Mixed hyperlipidemia    Stroke risk factor  LDL 28.6  Did not continue Atorvastatin at d/c as no acute infarct this admission        Type 2 diabetes mellitus    Stroke risk factor   Hgb A1C 7.9  Home meds include lantus and metformin; Continue at d/c  POCT and SSI while admitted, titrate prn  Close PCP follow-up; BG expected to be elevated in setting of CST tx        Essential hypertension    Stroke risk factor  SBP <140 long-term  BP somewhat labile on home regimen  Recommend close PCP follow-up             06/03/2018 CHAR.  Patient remains w/ speech difficulty, left lower extremity weakness.  C/o numbness to the left fingers, left head and left neck pain that was relieved w/gabapentin and tylenol.    6/4: BP somewhat labile on home regimen. BG elevated likely 2/2 CST therapy. Initiated Plavix for stroke prophylaxis. Pt d/c'd home.    STROKE DOCUMENTATION   Acute Stroke Times   Last Known Normal Date: 06/01/18  Last Known Normal Time: 1430  Symptom Onset Date: 06/01/18  Symptom Onset Time: 1430  Stroke Team Called Date: 06/01/18  Stroke Team Called Time: 1836  Stroke Team Arrival Date: 06/01/18  Stroke Team Arrival Time: 1840  CT Interpretation Time: 1857  Decision to Treat Time for Alteplase: 1647  Decision to Treat Time for IR:  (No LVO)    NIH Scale:  1a. Level Of Consciousness: 0-->Alert: keenly responsive  1b. LOC Questions: 0-->Answers both questions correctly  1c. LOC Commands: 0-->Performs both tasks correctly  2. Best Gaze:  0-->Normal  3. Visual: 0-->No visual loss  4. Facial Palsy: 2-->Partial paralysis (total or near-total paralysis of lower face)  5a. Motor Arm, Left: 0-->No drift: limb holds 90 (or 45) degrees for full 10 secs  5b. Motor Arm, Right: 0-->No drift: limb holds 90 (or 45) degrees for full 10 secs  6a. Motor Leg, Left: 0-->No drift: leg holds 30 degree position for full 5 secs  6b. Motor Leg, Right: 0-->No drift: leg holds 30 degree position for full 5 secs  7. Limb Ataxia: 0-->Absent  8. Sensory: 1-->Mild-to-moderate sensory loss: patient feels pinprick is less sharp or is dull on the affected side: or there is a loss of superficial pain with pinprick, but patient is aware of being touched  9. Best Language: 0-->No aphasia: normal  10. Dysarthria: 1-->Mild-to-moderate dysarthria: patient slurs at least some words and, at worst, can be understood with some difficulty  11. Extinction and Inattention (formerly Neglect): 0-->No abnormality  Total (NIH Stroke Scale): 4       Modified Jarad Score: 0  Claus Coma Scale:    ABCD2 Score:    [id number] Score:   HAS -BLED Score:   ICH Score:   Hunt & Galvin Classification:      Hemorrhagic change of an Ischemic Stroke: Does this patient have an ischemic stroke with hemorrhagic changes? No     Neurologic Chief Complaint:  Left sided weakness, dysarthria    Subjective:     Interval History: Patient is seen for follow-up neurological assessment and treatment recommendations: CLAUDINEON.  BP somewhat labile on home regimen. BG elevated likely 2/2 CST therapy. Initiated Plavix for stroke prophylaxis. Pt d/c'd home.    HPI, Past Medical, Family, and Social History remains the same as documented in the initial encounter.     Review of Systems   Constitutional: Negative for chills and fever.   Eyes: Negative for redness and visual disturbance.   Neurological: Positive for facial asymmetry, speech difficulty and numbness. Negative for tremors and weakness.   Psychiatric/Behavioral: Negative  for agitation and confusion.     Scheduled Meds:   atorvastatin  40 mg Oral Daily    clopidogrel  75 mg Oral Daily    gabapentin  300 mg Oral TID    losartan  50 mg Oral Daily    methylPREDNISolone  4 mg Oral Once    Followed by    methylPREDNISolone  4 mg Oral Once    Followed by    methylPREDNISolone  4 mg Oral Once    Followed by    [START ON 6/5/2018] methylPREDNISolone  4 mg Oral Once    Followed by    [START ON 6/5/2018] methylPREDNISolone  4 mg Oral Once    Followed by    [START ON 6/5/2018] methylPREDNISolone  4 mg Oral Once    Followed by    [START ON 6/5/2018] methylPREDNISolone  4 mg Oral Once    Followed by    [START ON 6/6/2018] methylPREDNISolone  4 mg Oral Once    Followed by    [START ON 6/6/2018] methylPREDNISolone  4 mg Oral Once    Followed by    [START ON 6/6/2018] methylPREDNISolone  4 mg Oral Once    Followed by    [START ON 6/7/2018] methylPREDNISolone  4 mg Oral Once    Followed by    [START ON 6/7/2018] methylPREDNISolone  4 mg Oral Once    polyethylene glycol  17 g Oral Daily    sodium chloride 0.9%  3 mL Intravenous Q8H    valACYclovir  1,000 mg Oral TID     Continuous Infusions:  PRN Meds:acetaminophen, dextrose 50%, glucagon (human recombinant), insulin aspart U-100, labetalol, ondansetron    Objective:     Vital Signs (Most Recent):  Temp: 98.4 °F (36.9 °C) (06/04/18 1131)  Pulse: 78 (06/04/18 1131)  Resp: 18 (06/04/18 1131)  BP: (!) 140/65 (06/04/18 1131)  SpO2: 95 % (06/04/18 1131)  BP Location: Right arm    Vital Signs Range (Last 24H):  Temp:  [96.1 °F (35.6 °C)-98.7 °F (37.1 °C)]   Pulse:  [60-79]   Resp:  [17-18]   BP: (104-172)/(50-80)   SpO2:  [88 %-96 %]   BP Location: Right arm    Physical Exam   Constitutional: She is oriented to person, place, and time. She appears well-developed and well-nourished. No distress.   HENT:   Head: Normocephalic and atraumatic.   Eyes: Conjunctivae and EOM are normal.   Cardiovascular: Normal rate.    Pulmonary/Chest: Effort  normal. No respiratory distress.   Musculoskeletal: Normal range of motion. She exhibits no edema or deformity.   Neurological: She is alert and oriented to person, place, and time. A cranial nerve deficit and sensory deficit is present.   Skin: Skin is warm and dry.   Psychiatric: She has a normal mood and affect. Her behavior is normal.   Nursing note and vitals reviewed.      Neurological Exam:   LOC: alert  Attention Span: Good   Language: No aphasia  Articulation: stuttering/stammering speech pattern  Orientation: Person, Place, Time   EOM (CN III, IV, VI): Full/intact  Facial Movement (CN VII): Upper & lower facial weakness on the Left  Motor: Arm left  Normal 5/5  Leg left  Paresis: 4/5  Arm right  Normal 5/5  Leg right Normal 5/5  Cebellar: No evidence of appendicular or axial ataxia  Sensation: Ivan-anesthesia left     Laboratory:  CMP:     Recent Labs  Lab 06/04/18 0431   CALCIUM 10.5   ALBUMIN 4.1   PROT 7.3   *   K 4.9   CO2 22*      BUN 17   CREATININE 1.0   ALKPHOS 48*   ALT 23   AST 16   BILITOT 0.7     CBC:     Recent Labs  Lab 06/04/18 0431   WBC 10.62   RBC 4.56   HGB 13.9   HCT 41.4      MCV 91   MCH 30.5   MCHC 33.6     Lipid Panel:     Recent Labs  Lab 06/01/18 1912   CHOL 103*   LDLCALC 28.6*   HDL 48   TRIG 132     Coagulation:   Recent Labs  Lab 06/01/18 1912 06/04/18 0431   INR 1.1  < > 1.1   APTT 24.4  --   --    < > = values in this interval not displayed.  Hgb A1C:     Recent Labs  Lab 06/01/18 1912   HGBA1C 7.9*     TSH:     Recent Labs  Lab 06/01/18 1912   TSH 1.819       Diagnostic Results     Brain Imaging   MRI Brain 6/02/18  Unremarkable motion limited noncontrast MRI brain specifically without evidence for acute infarction or hydrocephalus.    Vessel Imaging   CTA MP 6/01/18 Normal multiphase CT angiogram of the head and neck.  No evidence of large vessel occlusion in the intracranial circulation or hemodynamically significant stenosis in the neck  vessels.  No acute intracranial abnormality.  Please consider MRI of the brain for follow-up.    Cardiac Imaging   2D Echo 6/02/18   CONCLUSIONS     1 - Normal left ventricular systolic function (EF 60-65%).     2 - No wall motion abnormalities.     3 - Normal left ventricular diastolic function.     4 - Normal right ventricular systolic function .     5 - The estimated PA systolic pressure is 24 mmHg.     6 - Mild tricuspid regurgitation.       Sonia Gomes PA-C  Comprehensive Stroke Center  Department of Vascular Neurology   [facility]

## 2018-06-04 NOTE — PLAN OF CARE
CM informed covering sw pt will d/c with HH today. Pt would like to use [facility]  ([phone], f/[phone]). [facility]CO faxed referral.    Jocelyn Tracey, COLBY [phone]

## 2018-06-04 NOTE — PT/OT/SLP DISCHARGE
Occupational Therapy Discharge Summary    Sandy Prince  MRN: [id number]   Principal Problem: Left-sided weakness      Patient Discharged from acute Occupational Therapy on 6/4.  Please refer to prior OT note dated 6/4 for functional status.    Assessment:      Patient appropriate for care in another setting.    Objective:     GOALS:    Occupational Therapy Goals        Problem: Occupational Therapy Goal    Goal Priority Disciplines Outcome Interventions   Occupational Therapy Goal     OT, PT/OT Ongoing (interventions implemented as appropriate)    Description:  Goals set 6/4 to be addressed for 7 days with expiration date, 6/11:  Patient will increase functional independence with ADLs by performing:  Patient will demonstrate stand pivot transfers with modified independence.   Not met  Patient will demonstrate grooming while standing with modified independence.   Not met  Patient will demonstrate upper body dressing with modified independence.   Not met  Patient will demonstrate lower body dressing with modified independence.   Not met  Patient will demonstrate toileting with modified independence.   Not met  Patient will demonstrate bathing while seated EOB with modified independence.   Not met  Patient and/or patient's family will verbalize understanding of stroke prevention guidelines, personal risk factors and stroke warning signs via teachback method.  Not met                              Reasons for Discontinuation of Therapy Services  Transfer to alternate level of care.      Plan:     Patient Discharged to: outpt OT recommended  GITA Green  6/4/2018

## 2018-06-04 NOTE — ASSESSMENT & PLAN NOTE
Transfer from [facility] for CTA MP for possible intervention. Dr. Rapp completed a telestroke on the patient and TPA was given at 1647. Neurological symptoms consisted of LSW and numbness with patient complaining weakness in her face and tongue. Patient was transferred for CTA MP which did not reveal a LVO therefore is not an IR candidate. On exam patient has LLE drift and c/o of blurry vision in her left eye with a HA in that area. No hemianopsia appreciated on exam. Patient is having right facial twitching mainly with her right eye. She states her speech is slurred and she is also stuttering. Not aphasic. Admit to [facility] s/p tPA infusion and for close neurological monitoring.     Patient continues to have left sided headache/pressure, LSW, and speech difficulty. MRI Brain negative for acute findings. The patient's continued symptoms in the setting of negative brain imaging could be related to Bell's Palsy, conversion disorder. The patient was started on steroids in [facility].      Prednisone and Valtrex ordered, continuing home BP and DM regimens at d/c. Initiated Plavix.   Pt d/c'd home with plans for home health and close PCP follow-up to further manage BP and BG levels (in setting of CST tx.) Pt will follow up with Vascular Neurology one time only in 4-6 weeks.      Antithrombotics for secondary stroke prevention:  Plavix 75 mg Daily  Statins for secondary stroke prevention and hyperlipidemia, if present:   Statins: None: Reason: LDL 28.6, No acute cerebral infarct  Aggressive risk factor modification: HTN, DM, HLD, CAD  Rehab efforts: PT/OT/SLP to evaluate and treat   PT/OT recommend Home with   SLP recommends dental soft, thin liquids  Diagnostics ordered/pending: None   VTE prophylaxis: Heparin 5000 units SQ every 8 hours  SCDs  BP parameters: SBP < 140 long-term

## 2018-06-04 NOTE — ASSESSMENT & PLAN NOTE
Stroke risk factor  LDL 28.6  Did not continue Atorvastatin at d/c as no acute infarct this admission

## 2018-06-04 NOTE — PT/OT/SLP PROGRESS
"Occupational Therapy   Treatment / Goals revised    Name: Sandy Prince  MRN: [id number]  Admitting Diagnosis:  Thrombotic stroke involving right middle cerebral artery       Recommendations:     Discharge Recommendations: outpatient OT  Discharge Equipment Recommendations:  none  Barriers to discharge:  None    Subjective   Patient:  "Talking and being able to eat and swallow is my worse.  I'm doing better with walking and my left hand is stronger.  The  is not 100%; I am afraid I will drop things.  My left eye bothers me.  I have to use my hand to close my left eye."    Dtg:  "She has a lot of people with her and we will be available to assist."   Communicated with: Nurse prior to session.  Pain/Comfort:  · Pain Rating 1: 0/10  · Pain Rating Post-Intervention 1: 0/10    Patients cultural, spiritual, Baptist conflicts given the current situation: Restorationism    Objective:     Patient found with: bed alarm, blood pressure cuff, peripheral IV, pulse ox (continuous), telemetry  Dtg present.  General Precautions: Standard, aspiration, fall   Orthopedic Precautions:N/A   Braces: N/A     Occupational Performance:    Bed Mobility:    · Patient completed Rolling/Turning to Left with  independence  · Patient completed Rolling/Turning to Right with independence  · Patient completed Scooting/Bridging with independence  · Patient completed Supine to Sit with independence  · Patient completed Sit to Supine with independence     Functional Mobility/Transfers:  · Patient completed Sit <> Stand Transfer with supervision  with  no assistive device   · Patient completed Bed <> Chair Transfer using Stand Pivot technique with supervision with no assistive device  · Patient completed Toilet Transfer Stand Pivot technique with supervision with  no AD    Activities of Daily Living:  · Grooming: supervision while standing  · UB Dressing: supervision while seated  · LB Dressing: supervision while seated  · Toileting: supervision with std " commode    Patient left supine with all lines intact, call button in reach and bed alarm on    AMPAC 6 Click:  AMPAC Total Score: 19    Treatment & Education:  Patient/ Family education provided for stroke warning signs, prevention guidelines and personal risk factors.  Patient/ Family verbalizing understanding via teach back method.   Patient education provided on role of OT and need for outpt OT upon discharge. Patient alert and oriented x 3; able to follow 4/4 one step commands.  Patient attentive and interactive throughout the session.  Left UE strength 4/5.  Able to tie laces without difficulty.  Patient's functional status and disposition recommendation discussed with stroke team in daily rounds.  White board updated in patient's room.  OT asked if there were any other questions; patient/ family had no further questions.       Education:    Assessment:     Sandy Prince is a 72 y.o. female with a medical diagnosis of Thrombotic stroke involving right middle cerebral artery.  She presents with performance deficits affecting function are weakness, impaired functional mobilty, impaired self care skills, impaired balance, impaired fine motor.  Improvements noted with transfers and ADLs.    Rehab Prognosis:  Good; patient would benefit from acute skilled OT services to address these deficits and reach maximum level of function.       Plan:     Patient to be seen 4 x/week to address the above listed problems via self-care/home management, neuromuscular re-education, cognitive retraining, sensory integration, therapeutic activities, therapeutic exercises  · Plan of Care Expires: 06/30/18  · Plan of Care Reviewed with: patient, daughter    This Plan of care has been discussed with the patient who was involved in its development and understands and is in agreement with the identified goals and treatment plan    GOALS:    Occupational Therapy Goals        Problem: Occupational Therapy Goal    Goal Priority Disciplines  Outcome Interventions   Occupational Therapy Goal     OT, PT/OT Ongoing (interventions implemented as appropriate)    Description:  Goals set 6/4 to be addressed for 7 days with expiration date, 6/11:  Patient will increase functional independence with ADLs by performing:  Patient will demonstrate stand pivot transfers with modified independence.   Not met  Patient will demonstrate grooming while standing with modified independence.   Not met  Patient will demonstrate upper body dressing with modified independence.   Not met  Patient will demonstrate lower body dressing with modified independence.   Not met  Patient will demonstrate toileting with modified independence.   Not met  Patient will demonstrate bathing while seated EOB with modified independence.   Not met  Patient and/or patient's family will verbalize understanding of stroke prevention guidelines, personal risk factors and stroke warning signs via teachback method.  Not met                              Time Tracking:     OT Date of Treatment: 06/04/18  OT Start Time: 1000  OT Stop Time: 1032  OT Total Time (min): 32 min    Billable Minutes:Self Care/Home Management 32    GITA Green  6/4/2018

## 2018-06-04 NOTE — SUBJECTIVE & OBJECTIVE
Past Medical History:   Diagnosis Date    DM (diabetes mellitus)     Essential hypertension 6/1/2018    HTN (hypertension)     Mixed hyperlipidemia 6/1/2018    Thrombotic stroke involving right middle cerebral artery 6/1/2018    TIA (transient ischemic attack)     Von Willebrand disease      History reviewed. No pertinent surgical history.  Review of patient's allergies indicates:   Allergen Reactions    Aspirin        Scheduled Medications:    atorvastatin  40 mg Oral Daily    clopidogrel  75 mg Oral Daily    gabapentin  300 mg Oral TID    losartan  50 mg Oral Daily    methylPREDNISolone  4 mg Oral Once    Followed by    methylPREDNISolone  4 mg Oral Once    Followed by    methylPREDNISolone  4 mg Oral Once    Followed by    [START ON 6/5/2018] methylPREDNISolone  4 mg Oral Once    Followed by    [START ON 6/5/2018] methylPREDNISolone  4 mg Oral Once    Followed by    [START ON 6/5/2018] methylPREDNISolone  4 mg Oral Once    Followed by    [START ON 6/5/2018] methylPREDNISolone  4 mg Oral Once    Followed by    [START ON 6/6/2018] methylPREDNISolone  4 mg Oral Once    Followed by    [START ON 6/6/2018] methylPREDNISolone  4 mg Oral Once    Followed by    [START ON 6/6/2018] methylPREDNISolone  4 mg Oral Once    Followed by    [START ON 6/7/2018] methylPREDNISolone  4 mg Oral Once    Followed by    [START ON 6/7/2018] methylPREDNISolone  4 mg Oral Once    polyethylene glycol  17 g Oral Daily    sodium chloride 0.9%  3 mL Intravenous Q8H    valACYclovir  1,000 mg Oral TID       PRN Medications: acetaminophen, dextrose 50%, glucagon (human recombinant), insulin aspart U-100, labetalol, ondansetron    Family History     None        Social History Main Topics    Smoking status: Former Smoker     Packs/day: 0.25     Types: Cigarettes     Quit date: 1/2/1978    Smokeless tobacco: Never Used    Alcohol use No    Drug use: No    Sexual activity: No     Review of Systems   Constitutional:  Negative for chills, fatigue and fever.   HENT: Negative for trouble swallowing and voice change.    Eyes: Negative for photophobia and visual disturbance.   Respiratory: Negative for cough, shortness of breath and wheezing.    Cardiovascular: Negative for chest pain and palpitations.   Gastrointestinal: Negative for abdominal distention, nausea and vomiting.   Genitourinary: Negative for difficulty urinating and flank pain.   Musculoskeletal: Positive for gait problem. Negative for arthralgias.   Skin: Negative for color change and rash.   Neurological: Positive for speech difficulty, weakness and headaches. Negative for numbness.   Psychiatric/Behavioral: Negative for agitation and confusion.     Objective:     Vital Signs (Most Recent):  Temp: 98.4 °F (36.9 °C) (18 1131)  Pulse: 78 (18 1131)  Resp: 18 (18 1131)  BP: (!) 140/65 (18 1131)  SpO2: 95 % (18 113)    Vital Signs (24h Range):  Temp:  [96.1 °F (35.6 °C)-98.7 °F (37.1 °C)] 98.4 °F (36.9 °C)  Pulse:  [59-79] 78  Resp:  [16-18] 18  SpO2:  [88 %-98 %] 95 %  BP: (104-184)/(50-84) 140/65     Body mass index is 26.31 kg/m².    Physical Exam   Constitutional: She appears well-developed and well-nourished.   Daughter at bedside   HENT:   Head: Normocephalic and atraumatic.   Eyes: EOM are normal. Pupils are equal, round, and reactive to light.   Neck: Normal range of motion.   Cardiovascular: Normal rate and regular rhythm.    Pulmonary/Chest: Effort normal. No respiratory distress.   Abdominal: Soft. There is no tenderness.   Musculoskeletal: Normal range of motion. She exhibits no deformity.   Neurological:   -  Mental Status:  AAOx3.  Follows commands.  Answers correct age and .    -  Speech and language:  - aphasia + dysarthria.    -  Motor:  Mild L sided weakness  -  Tone:  normal  -  Sensory:  Decreased to L to light touch and pin prick.     Psychiatric: She has a normal mood and affect. Her behavior is normal.   Vitals  reviewed.    NEUROLOGICAL EXAMINATION:     CRANIAL NERVES     CN III, IV, VI   Pupils are equal, round, and reactive to light.  Extraocular motions are normal.       Diagnostic Results:   Labs: Reviewed  ECG: Reviewed  X-Ray: Reviewed  CT: Reviewed  MRI: Reviewed

## 2018-06-04 NOTE — ASSESSMENT & PLAN NOTE
-significantly improved with OT and now rec outpatient OT    See hospital course for functional, cognitive/speech/language, and nutrition/swallow status.      Recommendations  -  Encourage mobility, OOB in chair at least 3 hours per day, and early ambulation as appropriate  -  PT/OT evaluate and treat  -  Pain management  -  Monitor for and prevent skin breakdown and pressure ulcers  · Early mobility, repositioning/weight shifting every 20-30 minutes when sitting, turn patient every 2 hours, proper mattress/overlay and chair cushioning, pressure relief/heel protector boots  -  DVT prophylaxis:  [facility]  -  Reviewed discharge options (IP rehab, SNF, HH therapy, and OP therapy)

## 2018-06-06 ENCOUNTER — TELEPHONE (OUTPATIENT)
Dept: PHARMACY | Facility: CLINIC | Age: 73
End: 2018-06-06

## 2018-06-06 ENCOUNTER — PATIENT OUTREACH (OUTPATIENT)
Dept: ADMINISTRATIVE | Facility: CLINIC | Age: 73
End: 2018-06-06

## 2018-06-06 ENCOUNTER — TELEPHONE (OUTPATIENT)
Dept: NEUROSURGERY | Facility: HOSPITAL | Age: 73
End: 2018-06-06

## 2018-06-06 ENCOUNTER — TELEPHONE (OUTPATIENT)
Dept: NEUROLOGY | Facility: CLINIC | Age: 73
End: 2018-06-06

## 2018-06-06 NOTE — TELEPHONE ENCOUNTER
Spoke with patient. Risk factors to patient for stroke discussed with teach back method implemented. Patient verbalized understanding of discharge instructions and medications. Follow up appointment discussed and transferred to clinic to schedule an appt. Warning signs were discussed with teach back method implemented. Instructed patient to seek medical help via 911 if new symptoms occur. Patient relayed no new questions or comments at this time.

## 2018-06-06 NOTE — DISCHARGE SUMMARY
[facility]  Vascular Neurology  Comprehensive Stroke Center  Discharge Summary     Summary:     Admit Date: 6/1/2018  6:39 PM    Discharge Date and Time: 6/4/2018  3:05 PM    Attending Physician: Ed Teixeira MD    Discharge Provider: Sonia Gomes PA-C    History of Present Illness: 72 year old female with a past medical history of HTN, DM, CAD, neuropathy, and previous TIA presents today as a trasnfer from [facility] for CTA MP for possible intervention. This afternoon at 1430 she started experiencing weakness/numbness on her left sided consisting of facial and extremity weakness. Dr. Rapp completed a telestroke on the patient and TPA was given at 1647. Patient was transferred for CTA MP which did not reveal a LVO patient is therefore not an IR candidate.    Hospital Course (synopsis of major diagnoses, care, treatment, and services provided during the course of the hospital stay): Mrs. Sandy Prince was admitted to [facility] for 24-hr close monitoring. Cerebral imaging negative for acute infarction. Etiology of patient's presentation more consistent with Bell's Palsy +/- associated Conversion disorder; treated accordingly with Prednisone and Valtrex. Initiated Plavix for stroke prophylaxis.    Patient discharged with recommendations for home health. Family at bedside amenable to plan.     The patient with improvement in symptoms since admission. Inpatient acute stroke work up completed and patient stable for discharge. Please see all appropriate medication changes, imaging results, and necessary follow-up below.    STROKE DOCUMENTATION   Acute Stroke Times   Last Known Normal Date: 06/01/18  Last Known Normal Time: 1430  Symptom Onset Date: 06/01/18  Symptom Onset Time: 1430  Stroke Team Called Date: 06/01/18  Stroke Team Called Time: 1836  Stroke Team Arrival Date: 06/01/18  Stroke Team Arrival Time: 1840  CT Interpretation Time: 1857  Decision to Treat Time for Alteplase:  0196  Decision to Treat Time for IR:  (No LVO)     NIH Scale:  1a. Level Of Consciousness: 0-->Alert: keenly responsive  1b. LOC Questions: 0-->Answers both questions correctly  1c. LOC Commands: 0-->Performs both tasks correctly  2. Best Gaze: 0-->Normal  3. Visual: 0-->No visual loss  4. Facial Palsy: 2-->Partial paralysis (total or near-total paralysis of lower face)  5a. Motor Arm, Left: 0-->No drift: limb holds 90 (or 45) degrees for full 10 secs  5b. Motor Arm, Right: 0-->No drift: limb holds 90 (or 45) degrees for full 10 secs  6a. Motor Leg, Left: 0-->No drift: leg holds 30 degree position for full 5 secs  6b. Motor Leg, Right: 0-->No drift: leg holds 30 degree position for full 5 secs  7. Limb Ataxia: 0-->Absent  8. Sensory: 1-->Mild-to-moderate sensory loss: patient feels pinprick is less sharp or is dull on the affected side: or there is a loss of superficial pain with pinprick, but patient is aware of being touched  9. Best Language: 0-->No aphasia: normal  10. Dysarthria: 1-->Mild-to-moderate dysarthria: patient slurs at least some words and, at worst, can be understood with some difficulty  11. Extinction and Inattention (formerly Neglect): 0-->No abnormality  Total (NIH Stroke Scale): 4        Modified Jarad Score: 2  [place] Coma Scale:    ABCD2 Score:    [id number] Score:   HAS -BLED Score:   ICH Score:   Hunt & Galvin Classification:       Assessment/Plan:       Diagnostic Results      Brain Imaging   MRI Brain 6/02/18  Unremarkable motion limited noncontrast MRI brain specifically without evidence for acute infarction or hydrocephalus.     Vessel Imaging   CTA MP 6/01/18 Normal multiphase CT angiogram of the head and neck.  No evidence of large vessel occlusion in the intracranial circulation or hemodynamically significant stenosis in the neck vessels.  No acute intracranial abnormality.  Please consider MRI of the brain for follow-up.     Cardiac Imaging   2D Echo 6/02/18   CONCLUSIONS     1 -  Normal left ventricular systolic function (EF 60-65%).     2 - No wall motion abnormalities.     3 - Normal left ventricular diastolic function.     4 - Normal right ventricular systolic function .     5 - The estimated PA systolic pressure is 24 mmHg.     6 - Mild tricuspid regurgitation.       Interventions: IV t-PA    Complications: None    Disposition: Home-Health Care [facility]  -  [facility]    Final Active Diagnoses:    Diagnosis Date Noted POA    PRINCIPAL PROBLEM:  Left-sided weakness [R53.1] 06/01/2018 Yes    Bell's palsy [G51.0] 06/04/2018 Unknown    Status post administration of tPA (rtPA) in a different facility within the last 24 hours prior to admission to current facility [Z92.82] 06/01/2018 Not Applicable    Essential hypertension [I10] 06/01/2018 Yes    Type 2 diabetes mellitus [E11.9] 06/01/2018 Yes    Mixed hyperlipidemia [E78.2] 06/01/2018 Yes    Impaired functional mobility and endurance [Z74.09] 06/04/2018 Unknown      Problems Resolved During this Admission:    Diagnosis Date Noted Date Resolved POA     * Left-sided weakness    72 year old female with a past medical history of HTN, DM, CAD, neuropathy, and previous TIA who presented to [facility] with LSW and numbness, weakness in her face and tongue transferred for CTA MP s/p tPA. No LVO so pt not an IR candidate. On exam, pt with LLE drift and stuttering speech (not aphasic.) Admitted to [facility] s/p tPA infusion and for close neurological monitoring.     Ms. Prince continues to have left sided headache/pressure, LSW, and speech difficulty. MRI Brain negative for acute findings. Patient's continued symptoms in setting of negative brain imaging could be related to Bell's Palsy, conversion disorder. Started on steroids, valtrex in [facility].    Pt needs close PCP follow-up to further manage BP and BG levels (in setting of CST tx.) She will follow up with Vascular Neurology one time only in 4-6 weeks.      Antithrombotics for secondary stroke  prevention:  Plavix 75 mg Daily  Statins for secondary stroke prevention and hyperlipidemia, if present:   Statins: None: Reason: LDL 28.6, No acute cerebral infarct  Aggressive risk factor modification: HTN, DM, HLD, CAD  Rehab efforts: Home with   BP parameters: SBP < 140 long-term        Bell's palsy    Pt with upper and lower Left CN VII palsy; Unable to close L eye  Prednisone 60mg Daily + Valtrex 1000mg Daily x 1 week  Pt to follow up with PCP and monitor for improvement, as well as manage BG levels while on CST tx  Pt with eye patch to be worn on the L eye at night        Status post administration of tPA (rtPA) in a different facility within the last 24 hours prior to admission to current facility    Admitted to [facility] s/p TPA administration -- Completed        Mixed hyperlipidemia    Stroke risk factor  LDL 28.6  Did not continue Atorvastatin at d/c as no acute infarct this admission        Type 2 diabetes mellitus    Stroke risk factor   Hgb A1C 7.9  Home meds include lantus and metformin; Continue at d/c  Close PCP follow-up (BG expected to be elevated in setting of CST tx)        Essential hypertension    Stroke risk factor  SBP <140 long-term  BP somewhat labile on home regimen  Recommend close PCP follow-up for necessary adjustments            Recommendations:     Post-discharge complication risks: Falls    Stroke Education given to: patient and family    Follow-up in Stroke Clinic in 4-6 weeks.     Discharge Plan:  Antithrombotics: Clopidogrel 75mg  Statin: None; LDL 28.6, No acute cerebral infarction this admission  Aggresive risk factor modification:  Hypertension  Diabetes  High Cholesterol  Diet  Exercise  Obesity  Carotid Artery disease    Follow Up:  Follow-up Information     Cass Bae NP On 6/14/2018.    Specialty:  Family Medicine  Why:  appt at 8:15 am  Contact information:  [facility]  [place] [address]  [phone]             [facility]  "Stroke Center.    Specialty:  Neurology  Why:  The office will call you to schedule an appt in 4-6 weeks. If the office does not call you by 6/11/18 please call to schedule an appt.  Contact information:  [address]  [facility] [address] [phone]  Additional information:  7th Floor           [facility] .    Why:  Home health nurse will call pt to schedule home visit  Contact information:  [phone]                 Patient Instructions:     WALKER FOR HOME USE   Order Specific Question Answer Comments   Type of Walker: Adult (5'4"-6'6")    With wheels? Yes    Height: 5' 7" (1.702 m)    Weight: 76.2 kg (168 lb)    Length of need (1-99 months): 99    Does patient have medical equipment at home? none access to cane and w/c   Please check all that apply: Walker will be used for gait training.    Please check all that apply: Patient is unable to safely ambulate without equipment.    Vendor: [facility]    Expected Date of Delivery: 6/4/2018          Medications:  Reconciled Home Medications:      Medication List      START taking these medications    clopidogrel 75 mg tablet  Commonly known as:  PLAVIX  Take 1 tablet (75 mg total) by mouth once daily.     predniSONE 20 MG tablet  Commonly known as:  DELTASONE  Take 1 tablet (20 mg total) by mouth 3 (three) times daily.     valACYclovir 1000 MG tablet  Commonly known as:  VALTREX  Take 1 tablet (1,000 mg total) by mouth 3 (three) times daily.        CONTINUE taking these medications    gabapentin 300 MG capsule  Commonly known as:  NEURONTIN  Take 300 mg by mouth 2 (two) times daily.     insulin glargine 100 unit/mL injection  Commonly known as:  LANTUS  Inject 40 Units into the skin 2 (two) times daily.     losartan 50 MG tablet  Commonly known as:  COZAAR  Take 50 mg by mouth once daily.     metFORMIN 500 MG tablet  Commonly known as:  GLUCOPHAGE  Take 500 mg by mouth 2 (two) times daily with meals.            Sonia Gomes, " HARI  Comprehensive Stroke Center  Department of Vascular Neurology   [facility]

## 2018-06-06 NOTE — TELEPHONE ENCOUNTER
----- Message from Meg Leblanc sent at 6/6/2018  2:13 PM CDT -----  Contact: pt @ [phone]  Calling to schedule an appt with Dr. Rapp 4-6 wks from discharge date 6/4. Patient had a stroke, please call.

## 2018-06-06 NOTE — ASSESSMENT & PLAN NOTE
Stroke risk factor   Hgb A1C 7.9  Home meds include lantus and metformin; Continue at d/c  Close PCP follow-up (BG expected to be elevated in setting of CST tx)

## 2018-06-06 NOTE — ASSESSMENT & PLAN NOTE
72 year old female with a past medical history of HTN, DM, CAD, neuropathy, and previous TIA who presented to [facility] with LSW and numbness, weakness in her face and tongue transferred for CTA MP s/p tPA. No LVO so pt not an IR candidate. On exam, pt with LLE drift and stuttering speech (not aphasic.) Admitted to [facility] s/p tPA infusion and for close neurological monitoring.     Ms. Prince continues to have left sided headache/pressure, LSW, and speech difficulty. MRI Brain negative for acute findings. Patient's continued symptoms in setting of negative brain imaging could be related to Bell's Palsy, conversion disorder. Started on steroids, valtrex in [facility].    Pt needs close PCP follow-up to further manage BP and BG levels (in setting of CST tx.) She will follow up with Vascular Neurology one time only in 4-6 weeks.      Antithrombotics for secondary stroke prevention:  Plavix 75 mg Daily  Statins for secondary stroke prevention and hyperlipidemia, if present:   Statins: None: Reason: LDL 28.6, No acute cerebral infarct  Aggressive risk factor modification: HTN, DM, HLD, CAD  Rehab efforts: Home with   BP parameters: SBP < 140 long-term

## 2018-06-06 NOTE — ASSESSMENT & PLAN NOTE
Stroke risk factor  SBP <140 long-term  BP somewhat labile on home regimen  Recommend close PCP follow-up for necessary adjustments

## 2018-06-06 NOTE — PATIENT INSTRUCTIONS
Discharge Instructions for Stroke  You have been diagnosed with a stroke, or with a TIA (transient ischemic attack). Or you have been identified as having a high risk for stroke. During a stroke, blood stops flowing to part of your brain. This can damage areas in the brain that control other parts of the body. Symptoms after a stroke depend on which part of the brain has been affected.  Stroke risk factors  Once youve had a stroke, youre at greater risk for another one. Listed below are some other factors that can increase your risk for a stroke:  · High blood pressure  · High cholesterol  · Cigarette or cigar smoking  · Diabetes  · Carotid or other artery disease  · Atrial fibrillation, atrial flutter, or other heart disease  · Not being physically active  · Obesity  · Certain blood disorders (such as sickle cell anemia)  · Excessive alcohol use  · Abuse of street drugs  · Race  · Gender  · Family history of stroke  · Diet high in salty, fried, or greasy foods  Changes in daily living  Doing your regular tasks may be difficult after youve had a stroke, but you can learn new ways to manage your daily activities. In fact, doing daily activities may help you to regain muscle strength and bring back function to affected limbs. Be patient, give yourself time to adjust, and appreciate the progress you make.  Daily activities  You may be at risk of falling. Make changes to your home to help you walk more easily. A therapist will decide if you need an assistive device to walk safely.  You may need to see an occupational therapist or physical therapist to learn new ways of doing things. For example, you may need to make adjustments when bathing or dressing:  Tips for showering or bathing  · Test the water temperature with a hand or foot that was not affected by the stroke.  · Use grab bars, a shower seat, a hand-held showerhead, and a long-handled brush.  Tips for getting dressed  · Dress while sitting, starting with  the affected side or limb.  · Wear shirts that pull easily over your head. Wear pants or skirts with elastic waistbands.  · Use zippers with loops attached to the pull tabs.  Lifestyle changes  · Take your medicines exactly as directed. Dont skip doses.  · Begin an exercise program. Ask your provider how to get started. Also ask how much activity you should try to get on a daily or weekly basis. You can benefit from simple activities such as walking or gardening.  · Limit alcohol intake. Men should have no more than 2 alcoholic drinks a day. Women should limit themselves to 1 alcoholic drink per day.  · Know your cholesterol level. Follow your providers recommendations about how to keep cholesterol under control.  · If you are a smoker, quit now. Join a stop-smoking program to improve your chances of success. Ask your provider about medicines or other methods to help you quit.  · Learn stress management techniques to help you deal with stress in your home and work life.  Diet  Your healthcare provider will give you information on dietary changes that you may need to make, based on your situation. Your provider may recommend that you see a registered dietitian for help with diet changes. Changes may include:  · Reducing fat and cholesterol intake  · Reducing salt (sodium) intake, especially if you have high blood pressure  · Eating more fresh vegetables and fruits  · Eating more lean proteins, such as fish, poultry, and beans and peas (legumes)  · Eating less red meat and processed meats  · Using low-fat dairy products  · Limiting vegetable oils and nut oils  · Limiting sweets and processed foods such as chips, cookies, and baked goods  Follow-up care  · Keep your medical appointments. Close follow-up is important to stroke rehabilitation and recovery.  · Some medicines require blood tests to check for progress or problems. Keep follow-up appointments for any blood tests ordered by your providers.  When to call  911  Call 911 right away if you have any of the following symptoms of stroke:  · Weakness, tingling, or loss of feeling on one side of your face or body  · Sudden double vision or trouble seeing in one or both eyes  · Sudden trouble talking or slurred speech  · Trouble understanding others  · Sudden, severe headache  · Dizziness, loss of balance, or a sense of falling  · Blackouts or seizures      F.A.S.T. is an easy way to remember the signs of stroke. When you see these signs, you know that you need to call 911 fast.  F.A.S.T. stands for:  · F is for face drooping. One side of the face is drooping or numb. When the person smiles, the smile is uneven.  · A is for arm weakness. One arm is weak or numb. When the person lifts both arms at the same time, one arm may drift downward.  · S is for speech difficulty. You may notice slurred speech or trouble speaking. The person can't repeat a simple sentence correctly when asked.  · T is for time to call 911. If someone shows any of these symptoms, even if they go away, call 911 immediately. Make note of the time the symptoms first appeared.  Date Last Reviewed: 8/26/2015 © 2000-2018 [employer]. [address] [place] [address], [place] [address]. All rights reserved. This information is not intended as a substitute for professional medical care. Always follow your healthcare professional's instructions.

## 2018-07-23 ENCOUNTER — OFFICE VISIT (OUTPATIENT)
Dept: NEUROLOGY | Facility: CLINIC | Age: 73
End: 2018-07-23
Payer: MEDICARE

## 2018-07-23 VITALS
WEIGHT: 164 LBS | BODY MASS INDEX: 25.74 KG/M2 | SYSTOLIC BLOOD PRESSURE: 139 MMHG | HEIGHT: 67 IN | DIASTOLIC BLOOD PRESSURE: 80 MMHG | HEART RATE: 63 BPM

## 2018-07-23 DIAGNOSIS — Z86.69 HISTORY OF BELL'S PALSY: ICD-10-CM

## 2018-07-23 PROCEDURE — 99214 OFFICE O/P EST MOD 30 MIN: CPT | Mod: PBBFAC | Performed by: PSYCHIATRY & NEUROLOGY

## 2018-07-23 PROCEDURE — 99214 OFFICE O/P EST MOD 30 MIN: CPT | Mod: S$PBB,,, | Performed by: PSYCHIATRY & NEUROLOGY

## 2018-07-23 PROCEDURE — 99999 PR PBB SHADOW E&M-EST. PATIENT-LVL IV: CPT | Mod: PBBFAC,,, | Performed by: PSYCHIATRY & NEUROLOGY

## 2018-07-23 RX ORDER — TAMSULOSIN HYDROCHLORIDE 0.4 MG/1
CAPSULE ORAL
Refills: 0 | COMMUNITY
Start: 2018-04-15 | End: 2022-12-09 | Stop reason: CLARIF

## 2018-07-23 RX ORDER — INSULIN GLARGINE 100 [IU]/ML
INJECTION, SOLUTION SUBCUTANEOUS
Refills: 0 | COMMUNITY
Start: 2018-04-24 | End: 2022-12-09 | Stop reason: CLARIF

## 2018-07-23 RX ORDER — CYCLOBENZAPRINE HCL 5 MG
5 TABLET ORAL NIGHTLY
Refills: 2 | COMMUNITY
Start: 2018-07-18

## 2018-07-23 RX ORDER — ATORVASTATIN CALCIUM 20 MG/1
20 TABLET, FILM COATED ORAL DAILY
Refills: 10 | COMMUNITY
Start: 2018-06-23

## 2018-07-23 RX ORDER — TRAMADOL HYDROCHLORIDE 50 MG/1
50 TABLET ORAL 3 TIMES DAILY
Refills: 0 | COMMUNITY
Start: 2018-04-18 | End: 2022-12-09 | Stop reason: CLARIF

## 2018-07-23 RX ORDER — INSULIN ASPART 100 [IU]/ML
INJECTION, SOLUTION INTRAVENOUS; SUBCUTANEOUS
Refills: 0 | COMMUNITY
Start: 2018-06-05 | End: 2022-12-09 | Stop reason: CLARIF

## 2018-07-23 RX ORDER — PANTOPRAZOLE SODIUM 20 MG/1
20 TABLET, DELAYED RELEASE ORAL DAILY
Refills: 0 | COMMUNITY
Start: 2018-06-23

## 2018-07-23 NOTE — LETTER
July 23, 2018      Cass Bae, WILLY  [address]  [place] [facility]  [place] [address]           [facility]  [facility]  [place] [address]  Phone: [phone]          Patient: Sandy Prince   MR Number: [id number]   YOB: 1945   Date of Visit: 7/23/2018       Dear Cass Bae:    Thank you for referring Sandy Prince to me for evaluation. Attached you will find relevant portions of my assessment and plan of care.    If you have questions, please do not hesitate to call me. I look forward to following Sandy Prince along with you.    Sincerely,    Von Mortensen MD    Enclosure  CC:  No Recipients    If you would like to receive this communication electronically, please contact [email or web address] or [phone] to request more information on [facility] access.    For providers and/or their staff who would like to refer a patient to [facility], please contact us through our one-stop-shop provider referral line, Cristy Wyman, at [phone].    If you feel you have received this communication in error or would no longer like to receive these types of communications, please e-mail [email or web address]

## 2018-07-23 NOTE — PROGRESS NOTES
"  Sandy Prince is a 72 y.o. year old female that  presents with   Chief Complaint   Patient presents with    Headache    Dizziness    Pain    Numbness   .     HPI:  Mrs Prince has HTN, DM, CAD, neuropathy, L Bell's palsy, and recent admission to the stroke service due to acute onset L sided weakness with L face involvement and dysarthria that was treated with iv alteplase due to concern for acute R brain infarct.  Stroke work up consisted of multiphase CTA head and neck that showed no LVO, high grade stenosis, vascular abnormality, or hemodynamically significant carotid disease, MRI brain that showed no acute stroke, and TTE that was unrevealing.  Patient reports that she is improving day by day but still having significant tearing from the L eye, can not close the L eye completely, chewing is problematic, and has trouble finding words out.  Denies imbalance, falls, double vision, confusion, or focal paresthesias. Said that the L leg is still " little bit weak".  Receiving speech therapy twice a week.  On plavix 75 mg daily and atorvastatin 20 mg daily respectively.  It is worth mentioning that patient endorses a great deal of stroke dealing with a granddaughter that has epilepsy.     Please, see attached synopsis of cerebrovascular history for further details regarding her admission to our stroke service.  History of Present Illness: 72 year old female with a past medical history of HTN, DM, CAD, neuropathy, and previous TIA presents today as a trasnfer from [facility] for CTA MP for possible intervention. This afternoon at 1430 she started experiencing weakness/numbness on her left sided consisting of facial and extremity weakness. Dr. Rapp completed a telestroke on the patient and TPA was given at 1647. Patient was transferred for CTA MP which did not reveal a LVO patient is therefore not an IR candidate.  Hospital Course (synopsis of major diagnoses, care, treatment, and services provided during the " course of the hospital stay): Mrs. Sandy Prince was admitted to [facility] s/p tPA for 24-hr close monitoring. Cerebral imaging negative for acute infarction. Etiology of patient's presentation more consistent with Bell's Palsy +/- associated Conversion disorder; treated accordingly with Prednisone and Valtrex. Initiated Plavix for stroke prophylaxis.  Patient discharged with recommendations for home health. Family at bedside amenable to plan.   The patient with improvement in symptoms since admission. Inpatient acute stroke work up completed and patient stable for discharge. Please see all appropriate medication changes, imaging results, and necessary follow-up below.     STROKE DOCUMENTATION   Acute Stroke Times   Last Known Normal Date: 06/01/18  Last Known Normal Time: 1430  Symptom Onset Date: 06/01/18  Symptom Onset Time: 1430  Stroke Team Called Date: 06/01/18  Stroke Team Called Time: 1836  Stroke Team Arrival Date: 06/01/18  Stroke Team Arrival Time: 1840  CT Interpretation Time: 1857  Decision to Treat Time for Alteplase: 1647  Decision to Treat Time for IR:  (No LVO)      NIH Scale:  1a. Level Of Consciousness: 0-->Alert: keenly responsive  1b. LOC Questions: 0-->Answers both questions correctly  1c. LOC Commands: 0-->Performs both tasks correctly  2. Best Gaze: 0-->Normal  3. Visual: 0-->No visual loss  4. Facial Palsy: 2-->Partial paralysis (total or near-total paralysis of lower face)  5a. Motor Arm, Left: 0-->No drift: limb holds 90 (or 45) degrees for full 10 secs  5b. Motor Arm, Right: 0-->No drift: limb holds 90 (or 45) degrees for full 10 secs  6a. Motor Leg, Left: 0-->No drift: leg holds 30 degree position for full 5 secs  6b. Motor Leg, Right: 0-->No drift: leg holds 30 degree position for full 5 secs  7. Limb Ataxia: 0-->Absent  8. Sensory: 1-->Mild-to-moderate sensory loss: patient feels pinprick is less sharp or is dull on the affected side: or there is a loss of superficial pain with pinprick, but  patient is aware of being touched  9. Best Language: 0-->No aphasia: normal  10. Dysarthria: 1-->Mild-to-moderate dysarthria: patient slurs at least some words and, at worst, can be understood with some difficulty  11. Extinction and Inattention (formerly Neglect): 0-->No abnormality  Total (NIH Stroke Scale): 4  Modified [place] Score: 2  Readfield Coma Scale:    ABCD2 Score:    [id number] Score:   HAS -BLED Score:   ICH Score:   Hunt & Galvin Classification:       Diagnostic Results      Brain Imaging   MRI Brain 6/02/18  Unremarkable motion limited noncontrast MRI brain specifically without evidence for acute infarction or hydrocephalus.     Vessel Imaging   CTA MP 6/01/18 Normal multiphase CT angiogram of the head and neck.  No evidence of large vessel occlusion in the intracranial circulation or hemodynamically significant stenosis in the neck vessels.  No acute intracranial abnormality.  Please consider MRI of the brain for follow-up.     Cardiac Imaging   2D Echo 6/02/18   CONCLUSIONS     1 - Normal left ventricular systolic function (EF 60-65%).     2 - No wall motion abnormalities.     3 - Normal left ventricular diastolic function.     4 - Normal right ventricular systolic function .     5 - The estimated PA systolic pressure is 24 mmHg.     6 - Mild tricuspid regurgitation.     Interventions: IV t-PA     Complications: None     Disposition: Home-Health Care [facility]            Final Active Diagnoses:     Diagnosis Date Noted POA    PRINCIPAL PROBLEM:  Left-sided weakness [R53.1] 06/01/2018 Yes    Bell's palsy [G51.0] 06/04/2018 Unknown    Status post administration of tPA (rtPA) in a different facility within the last 24 hours prior to admission to current facility [Z92.82] 06/01/2018 Not Applicable    Essential hypertension [I10] 06/01/2018 Yes    Type 2 diabetes mellitus [E11.9] 06/01/2018 Yes    Mixed hyperlipidemia [E78.2] 06/01/2018 Yes    Impaired functional mobility and endurance  [Z74.09] 06/04/2018 Unknown       Problems Resolved During this Admission:     Diagnosis Date Noted Date Resolved POA          * Left-sided weakness     72 year old female with a past medical history of HTN, DM, CAD, neuropathy, and previous TIA who presented to [facility] with LSW and numbness, weakness in her face and tongue transferred for CTA MP s/p tPA. No LVO so pt not an IR candidate. On exam, pt with LLE drift and stuttering speech (not aphasic.) Admitted to [facility] s/p tPA infusion and for close neurological monitoring.      Ms. Prince continues to have left sided headache/pressure, LSW, and speech difficulty. MRI Brain negative for acute findings. Patient's continued symptoms in setting of negative brain imaging could be related to Bell's Palsy, conversion disorder. Started on steroids, valtrex in [facility].    Pt needs close PCP follow-up to further manage BP and BG levels (in setting of CST tx.) She will follow up with Vascular Neurology one time only in 4-6 weeks.        Antithrombotics for secondary stroke prevention:  Plavix 75 mg Daily  Statins for secondary stroke prevention and hyperlipidemia, if present:   Statins: None: Reason: LDL 28.6, No acute cerebral infarct  Aggressive risk factor modification: HTN, DM, HLD, CAD  Rehab efforts: Home with   BP parameters: SBP < 140 long-term          Bell's palsy     Pt with upper and lower Left CN VII palsy; Unable to close L eye  Prednisone 60mg Daily + Valtrex 1000mg Daily x 1 week  Pt to follow up with PCP and monitor for improvement, as well as manage BG levels while on CST tx  Pt with eye patch to be worn on the L eye at night          Status post administration of tPA (rtPA) in a different facility within the last 24 hours prior to admission to current facility     Admitted to [facility] s/p TPA administration -- Completed          Mixed hyperlipidemia     Stroke risk factor  LDL 28.6  Did not continue Atorvastatin at d/c as no acute infarct this admission           Type 2 diabetes mellitus     Stroke risk factor   Hgb A1C 7.9  Home meds include lantus and metformin; Continue at d/c  Close PCP follow-up (BG expected to be elevated in setting of CST tx)          Essential hypertension     Stroke risk factor  SBP <140 long-term  BP somewhat labile on home regimen  Recommend close PCP follow-up for necessary adjustments             Recommendations:      Post-discharge complication risks: Falls     Stroke Education given to: patient and family     Follow-up in Stroke Clinic in 4-6 weeks.      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     Social History Main Topics    Smoking status: Former Smoker     Packs/day: 0.25     Types: Cigarettes     Quit date: 1/2/1978    Smokeless tobacco: Never Used    Alcohol use No    Drug use: No    Sexual activity: No     Other Topics Concern    Not on file     Social History Narrative    No narrative on file     No past surgical history on file.  No family history on file.      Review of Systems  General ROS: negative for chills, fever or weight loss  Psychological ROS: negative for hallucination, depression or suicidal ideation  Ophthalmic ROS: negative for blurry vision, photophobia or eye pain  ENT ROS: negative for epistaxis, sore throat or  "rhinorrhea  Respiratory ROS: no cough, shortness of breath, or wheezing  Cardiovascular ROS: no chest pain or dyspnea on exertion  Gastrointestinal ROS: no abdominal pain, change in bowel habits, or black/ bloody stools  Genito-Urinary ROS: no dysuria, trouble voiding, or hematuria  Musculoskeletal ROS: negative for gait disturbance or muscular weakness  Neurological ROS: no syncope or seizures; no ataxia  Dermatological ROS: negative for pruritis, rash and jaundice      Physical Exam:  /80   Pulse 63   Ht 5' 7" (1.702 m)   Wt 74.4 kg (164 lb)   BMI 25.69 kg/m²   General appearance: alert, cooperative, no distress  Constitutional:Oriented to person, place, and time.appears well-developed and well-nourished.   HEENT: Normocephalic, atraumatic, neck symmetrical, no nasal discharge   Eyes: conjunctivae/corneas clear, PERRL, EOM's intact  Lungs: clear to auscultation bilaterally, no dullness to percussion bilaterally  Heart: regular rate and rhythm without rub; no displacement of the PMI   Abdomen: soft, non-tender; bowel sounds normoactive; no organomegaly  Extremities: extremities symmetric; no clubbing, cyanosis, or edema  Integument: Skin color, texture, turgor normal; no rashes; hair distrubution normal  Neurologic:   Mental status: alert and awake, oriented x 4, comprehension, naming, and repetition intact. No right to left confusion. Performs serial 7's without difficulty .No dysarthria.  CN 2-12: pupils 4 mm bilaterally, reactive to light. Fundi without papilledema. Visual fields full to confrontation. EOM full without nystagmus. Face sensation decreased L side.. Face asymmetric due to complete paralysis L face consistent with L facial palsy peripheral type. Hearing grossly intact. Palate elevates well. Tongue midline without atrophy or fasciculations.  Motor: 5/5 all over except mild weakness L leg (unsure if she providing full effort on muscle testing).  Sensory: intact in all modalities.  DTR's: 2+ " all over.  Plantars: no tested.  Coordination: finger to nose and heel-knee-shin intact.  Gait: no ataxia or bradykinesia  Psychiatric: no pressured speech; normal affect; no evidence of impaired cognition     LABS:    Complete Blood Count  Lab Results   Component Value Date    RBC 4.56 06/04/2018    HGB 13.9 06/04/2018    HCT 41.4 06/04/2018    MCV 91 06/04/2018    MCH 30.5 06/04/2018    MCHC 33.6 06/04/2018    RDW 11.4 (L) 06/04/2018     06/04/2018    MPV 11.2 06/04/2018    GRAN 8.1 (H) 06/04/2018    GRAN 76.0 (H) 06/04/2018    LYMPH 1.7 06/04/2018    LYMPH 15.5 (L) 06/04/2018    MONO 0.8 06/04/2018    MONO 7.3 06/04/2018    EOS 0.1 06/04/2018    BASO 0.02 06/04/2018    EOSINOPHIL 0.5 06/04/2018    BASOPHIL 0.2 06/04/2018    DIFFMETHOD Automated 06/04/2018       Comprehensive Metabolic Panel  Lab Results   Component Value Date     (H) 06/04/2018    BUN 17 06/04/2018    CREATININE 1.0 06/04/2018     (L) 06/04/2018    K 4.9 06/04/2018     06/04/2018    PROT 7.3 06/04/2018    ALBUMIN 4.1 06/04/2018    BILITOT 0.7 06/04/2018    AST 16 06/04/2018    ALKPHOS 48 (L) 06/04/2018    CO2 22 (L) 06/04/2018    ALT 23 06/04/2018    ANIONGAP 9 06/04/2018    EGFRNONAA 55.6 (A) 06/04/2018    ESTGFRAFRICA >60.0 06/04/2018       TSH  Lab Results   Component Value Date    TSH 1.819 06/01/2018         Assessment: 73 y/o with HTN, DM, CAD, neuropathy, L Bell's palsy, and recent admission to the stroke service due to acute onset L sided weakness with L face involvement and dysarthria that was treated with iv alteplase due to concern for acute R brain infarct. Nonetheless, MRI negative for acute ischemia, unremarkable m-CTA brain and neck, and unimpressive TTE.  Patient has a classical L [place]'s palsy on exam and mild L leg weakness that is confounded by no full effort on muscle testing.    No diagnosis found.  There were no encounter diagnoses.    Plan:  1) L [place]'s palsy: gradually improving.  2) L sided  weakness s/p iv alteplase: though to be secondary to stroke mimic.  3) HTN, as per PCP  4) DM, follow by PCP  5) Neuropathy, on gabapentin as per PCP  6) CAD, managed by PCP and cardiology.  No orders of the defined types were placed in this encounter.          Von Mortensen MD

## 2022-12-08 PROBLEM — J11.1 INFLUENZA: Chronic | Status: ACTIVE | Noted: 2022-12-08

## 2022-12-08 PROBLEM — R07.9 CHEST PAIN: Status: ACTIVE | Noted: 2022-12-08

## 2022-12-09 PROBLEM — R79.89 ELEVATED LFTS: Status: ACTIVE | Noted: 2022-12-09

## 2022-12-09 PROBLEM — R07.89 ATYPICAL CHEST PAIN: Status: ACTIVE | Noted: 2022-12-08
